# Patient Record
Sex: FEMALE | Race: WHITE | NOT HISPANIC OR LATINO | Employment: OTHER | URBAN - METROPOLITAN AREA
[De-identification: names, ages, dates, MRNs, and addresses within clinical notes are randomized per-mention and may not be internally consistent; named-entity substitution may affect disease eponyms.]

---

## 2022-08-11 ENCOUNTER — LAB VISIT (OUTPATIENT)
Dept: LAB | Facility: HOSPITAL | Age: 62
End: 2022-08-11
Attending: FAMILY MEDICINE
Payer: COMMERCIAL

## 2022-08-11 ENCOUNTER — OFFICE VISIT (OUTPATIENT)
Dept: FAMILY MEDICINE | Facility: CLINIC | Age: 62
End: 2022-08-11
Payer: COMMERCIAL

## 2022-08-11 VITALS
WEIGHT: 169 LBS | DIASTOLIC BLOOD PRESSURE: 76 MMHG | TEMPERATURE: 98 F | BODY MASS INDEX: 31.1 KG/M2 | HEART RATE: 77 BPM | HEIGHT: 62 IN | RESPIRATION RATE: 18 BRPM | SYSTOLIC BLOOD PRESSURE: 132 MMHG | OXYGEN SATURATION: 99 %

## 2022-08-11 DIAGNOSIS — I10 HYPERTENSION, UNSPECIFIED TYPE: ICD-10-CM

## 2022-08-11 DIAGNOSIS — G47.33 OSA ON CPAP: ICD-10-CM

## 2022-08-11 DIAGNOSIS — I49.8 PACEMAKER-DEPENDENT DUE TO NATIVE CARDIAC RHYTHM INSUFFICIENT TO SUPPORT LIFE: ICD-10-CM

## 2022-08-11 DIAGNOSIS — Z95.0 PACEMAKER-DEPENDENT DUE TO NATIVE CARDIAC RHYTHM INSUFFICIENT TO SUPPORT LIFE: ICD-10-CM

## 2022-08-11 DIAGNOSIS — Z01.419 ENCOUNTER FOR ANNUAL ROUTINE GYNECOLOGICAL EXAMINATION: ICD-10-CM

## 2022-08-11 DIAGNOSIS — E78.5 HYPERLIPIDEMIA, UNSPECIFIED HYPERLIPIDEMIA TYPE: ICD-10-CM

## 2022-08-11 DIAGNOSIS — G47.00 INSOMNIA, UNSPECIFIED TYPE: ICD-10-CM

## 2022-08-11 DIAGNOSIS — Z95.0 PACEMAKER-DEPENDENT DUE TO NATIVE CARDIAC RHYTHM INSUFFICIENT TO SUPPORT LIFE: Primary | ICD-10-CM

## 2022-08-11 DIAGNOSIS — I49.8 PACEMAKER-DEPENDENT DUE TO NATIVE CARDIAC RHYTHM INSUFFICIENT TO SUPPORT LIFE: Primary | ICD-10-CM

## 2022-08-11 PROBLEM — C50.919 BREAST CANCER: Status: ACTIVE | Noted: 2022-08-11

## 2022-08-11 PROBLEM — F41.9 ANXIETY: Status: ACTIVE | Noted: 2022-08-11

## 2022-08-11 PROBLEM — K76.0 NON-ALCOHOLIC FATTY LIVER DISEASE: Status: ACTIVE | Noted: 2022-08-11

## 2022-08-11 PROBLEM — E66.9 OBESITY (BMI 30-39.9): Status: ACTIVE | Noted: 2019-03-25

## 2022-08-11 PROBLEM — G89.29 CHRONIC BILATERAL LOW BACK PAIN WITHOUT SCIATICA: Status: ACTIVE | Noted: 2020-11-09

## 2022-08-11 PROBLEM — M54.50 CHRONIC BILATERAL LOW BACK PAIN WITHOUT SCIATICA: Status: ACTIVE | Noted: 2020-11-09

## 2022-08-11 LAB
ALBUMIN SERPL-MCNC: 4.3 GM/DL (ref 3.4–4.8)
ALBUMIN/GLOB SERPL: 1.3 RATIO (ref 1.1–2)
ALP SERPL-CCNC: 111 UNIT/L (ref 40–150)
ALT SERPL-CCNC: 41 UNIT/L (ref 0–55)
AST SERPL-CCNC: 28 UNIT/L (ref 5–34)
BASOPHILS # BLD AUTO: 0.02 X10(3)/MCL (ref 0–0.2)
BASOPHILS NFR BLD AUTO: 0.4 %
BILIRUBIN DIRECT+TOT PNL SERPL-MCNC: 0.4 MG/DL
BUN SERPL-MCNC: 18 MG/DL (ref 9.8–20.1)
CALCIUM SERPL-MCNC: 10 MG/DL (ref 8.4–10.2)
CHLORIDE SERPL-SCNC: 104 MMOL/L (ref 98–107)
CHOLEST SERPL-MCNC: 175 MG/DL
CHOLEST/HDLC SERPL: 3 {RATIO} (ref 0–5)
CO2 SERPL-SCNC: 29 MMOL/L (ref 23–31)
CREAT SERPL-MCNC: 0.81 MG/DL (ref 0.55–1.02)
EOSINOPHIL # BLD AUTO: 0.11 X10(3)/MCL (ref 0–0.9)
EOSINOPHIL NFR BLD AUTO: 2 %
ERYTHROCYTE [DISTWIDTH] IN BLOOD BY AUTOMATED COUNT: 13.4 % (ref 11.5–17)
GFR SERPLBLD CREATININE-BSD FMLA CKD-EPI: >60 MLS/MIN/1.73/M2
GLOBULIN SER-MCNC: 3.3 GM/DL (ref 2.4–3.5)
GLUCOSE SERPL-MCNC: 83 MG/DL (ref 82–115)
HCT VFR BLD AUTO: 40.5 % (ref 37–47)
HDLC SERPL-MCNC: 60 MG/DL (ref 35–60)
HGB BLD-MCNC: 13.3 GM/DL (ref 12–16)
IMM GRANULOCYTES # BLD AUTO: 0.02 X10(3)/MCL (ref 0–0.04)
IMM GRANULOCYTES NFR BLD AUTO: 0.4 %
LDLC SERPL CALC-MCNC: 94 MG/DL (ref 50–140)
LYMPHOCYTES # BLD AUTO: 1.6 X10(3)/MCL (ref 0.6–4.6)
LYMPHOCYTES NFR BLD AUTO: 29.2 %
MCH RBC QN AUTO: 30.2 PG (ref 27–31)
MCHC RBC AUTO-ENTMCNC: 32.8 MG/DL (ref 33–36)
MCV RBC AUTO: 92 FL (ref 80–94)
MONOCYTES # BLD AUTO: 0.28 X10(3)/MCL (ref 0.1–1.3)
MONOCYTES NFR BLD AUTO: 5.1 %
NEUTROPHILS # BLD AUTO: 3.5 X10(3)/MCL (ref 2.1–9.2)
NEUTROPHILS NFR BLD AUTO: 62.9 %
NRBC BLD AUTO-RTO: 0 %
PLATELET # BLD AUTO: 151 X10(3)/MCL (ref 130–400)
PMV BLD AUTO: 9.1 FL (ref 7.4–10.4)
POTASSIUM SERPL-SCNC: 4.3 MMOL/L (ref 3.5–5.1)
PROT SERPL-MCNC: 7.6 GM/DL (ref 5.8–7.6)
RBC # BLD AUTO: 4.4 X10(6)/MCL (ref 4.2–5.4)
SODIUM SERPL-SCNC: 142 MMOL/L (ref 136–145)
TRIGL SERPL-MCNC: 104 MG/DL (ref 37–140)
VLDLC SERPL CALC-MCNC: 21 MG/DL
WBC # SPEC AUTO: 5.5 X10(3)/MCL (ref 4.5–11.5)

## 2022-08-11 PROCEDURE — 3008F PR BODY MASS INDEX (BMI) DOCUMENTED: ICD-10-PCS | Mod: CPTII,,, | Performed by: FAMILY MEDICINE

## 2022-08-11 PROCEDURE — 1160F RVW MEDS BY RX/DR IN RCRD: CPT | Mod: CPTII,,, | Performed by: FAMILY MEDICINE

## 2022-08-11 PROCEDURE — 3075F PR MOST RECENT SYSTOLIC BLOOD PRESS GE 130-139MM HG: ICD-10-PCS | Mod: CPTII,,, | Performed by: FAMILY MEDICINE

## 2022-08-11 PROCEDURE — 99205 OFFICE O/P NEW HI 60 MIN: CPT | Mod: ,,, | Performed by: FAMILY MEDICINE

## 2022-08-11 PROCEDURE — 85025 COMPLETE CBC W/AUTO DIFF WBC: CPT

## 2022-08-11 PROCEDURE — 3075F SYST BP GE 130 - 139MM HG: CPT | Mod: CPTII,,, | Performed by: FAMILY MEDICINE

## 2022-08-11 PROCEDURE — 1159F MED LIST DOCD IN RCRD: CPT | Mod: CPTII,,, | Performed by: FAMILY MEDICINE

## 2022-08-11 PROCEDURE — 3008F BODY MASS INDEX DOCD: CPT | Mod: CPTII,,, | Performed by: FAMILY MEDICINE

## 2022-08-11 PROCEDURE — 4010F PR ACE/ARB THEARPY RXD/TAKEN: ICD-10-PCS | Mod: CPTII,,, | Performed by: FAMILY MEDICINE

## 2022-08-11 PROCEDURE — 1159F PR MEDICATION LIST DOCUMENTED IN MEDICAL RECORD: ICD-10-PCS | Mod: CPTII,,, | Performed by: FAMILY MEDICINE

## 2022-08-11 PROCEDURE — 99205 PR OFFICE/OUTPT VISIT, NEW, LEVL V, 60-74 MIN: ICD-10-PCS | Mod: ,,, | Performed by: FAMILY MEDICINE

## 2022-08-11 PROCEDURE — 3078F PR MOST RECENT DIASTOLIC BLOOD PRESSURE < 80 MM HG: ICD-10-PCS | Mod: CPTII,,, | Performed by: FAMILY MEDICINE

## 2022-08-11 PROCEDURE — 1160F PR REVIEW ALL MEDS BY PRESCRIBER/CLIN PHARMACIST DOCUMENTED: ICD-10-PCS | Mod: CPTII,,, | Performed by: FAMILY MEDICINE

## 2022-08-11 PROCEDURE — 36415 COLL VENOUS BLD VENIPUNCTURE: CPT

## 2022-08-11 PROCEDURE — 80061 LIPID PANEL: CPT

## 2022-08-11 PROCEDURE — 4010F ACE/ARB THERAPY RXD/TAKEN: CPT | Mod: CPTII,,, | Performed by: FAMILY MEDICINE

## 2022-08-11 PROCEDURE — 3078F DIAST BP <80 MM HG: CPT | Mod: CPTII,,, | Performed by: FAMILY MEDICINE

## 2022-08-11 PROCEDURE — 80053 COMPREHEN METABOLIC PANEL: CPT

## 2022-08-11 RX ORDER — SPIRONOLACTONE 25 MG/1
25 TABLET ORAL DAILY
Qty: 30 TABLET | Refills: 3 | Status: SHIPPED | OUTPATIENT
Start: 2022-08-11

## 2022-08-11 RX ORDER — VENLAFAXINE HYDROCHLORIDE 75 MG/1
75 CAPSULE, EXTENDED RELEASE ORAL DAILY
Qty: 30 CAPSULE | Refills: 3 | Status: SHIPPED | OUTPATIENT
Start: 2022-08-11 | End: 2022-09-28

## 2022-08-11 RX ORDER — OMEPRAZOLE 20 MG/1
20 TABLET, DELAYED RELEASE ORAL EVERY MORNING
Qty: 30 TABLET | Refills: 3 | Status: ON HOLD | OUTPATIENT
Start: 2022-08-11 | End: 2024-02-12

## 2022-08-11 RX ORDER — VENLAFAXINE HYDROCHLORIDE 75 MG/1
75 CAPSULE, EXTENDED RELEASE ORAL
COMMUNITY
Start: 2022-01-21 | End: 2022-08-11 | Stop reason: SDUPTHER

## 2022-08-11 RX ORDER — OMEPRAZOLE 20 MG/1
20 TABLET, DELAYED RELEASE ORAL
COMMUNITY
End: 2022-08-11 | Stop reason: SDUPTHER

## 2022-08-11 RX ORDER — VENLAFAXINE HYDROCHLORIDE 150 MG/1
150 CAPSULE, EXTENDED RELEASE ORAL DAILY
COMMUNITY
Start: 2022-07-14 | End: 2022-08-11 | Stop reason: SDUPTHER

## 2022-08-11 RX ORDER — GABAPENTIN 300 MG/1
300 CAPSULE ORAL 2 TIMES DAILY
Qty: 60 CAPSULE | Refills: 3 | Status: SHIPPED | OUTPATIENT
Start: 2022-08-11 | End: 2022-09-19

## 2022-08-11 RX ORDER — TRAZODONE HYDROCHLORIDE 50 MG/1
50 TABLET ORAL NIGHTLY
Qty: 30 TABLET | Refills: 3 | Status: SHIPPED | OUTPATIENT
Start: 2022-08-11 | End: 2023-01-17 | Stop reason: SDUPTHER

## 2022-08-11 RX ORDER — CYCLOBENZAPRINE HCL 5 MG
5 TABLET ORAL DAILY PRN
COMMUNITY
Start: 2022-01-21

## 2022-08-11 RX ORDER — ROSUVASTATIN CALCIUM 10 MG/1
10 TABLET, COATED ORAL NIGHTLY
Qty: 30 TABLET | Refills: 3 | Status: SHIPPED | OUTPATIENT
Start: 2022-08-11 | End: 2023-06-29 | Stop reason: SDUPTHER

## 2022-08-11 RX ORDER — VENLAFAXINE HYDROCHLORIDE 150 MG/1
150 CAPSULE, EXTENDED RELEASE ORAL DAILY
Qty: 30 CAPSULE | Refills: 3 | Status: SHIPPED | OUTPATIENT
Start: 2022-08-11 | End: 2022-09-28

## 2022-08-11 RX ORDER — SPIRONOLACTONE 25 MG/1
25 TABLET ORAL
COMMUNITY
Start: 2022-01-21 | End: 2022-08-11 | Stop reason: SDUPTHER

## 2022-08-11 RX ORDER — LISINOPRIL 20 MG/1
1 TABLET ORAL DAILY
COMMUNITY
Start: 2022-04-26 | End: 2022-08-11 | Stop reason: SDUPTHER

## 2022-08-11 RX ORDER — GABAPENTIN 300 MG/1
1 CAPSULE ORAL 2 TIMES DAILY
COMMUNITY
Start: 2022-01-21 | End: 2022-08-11 | Stop reason: SDUPTHER

## 2022-08-11 RX ORDER — TRAZODONE HYDROCHLORIDE 50 MG/1
1 TABLET ORAL NIGHTLY
COMMUNITY
Start: 2022-03-22 | End: 2022-08-11 | Stop reason: SDUPTHER

## 2022-08-11 RX ORDER — LISINOPRIL 20 MG/1
20 TABLET ORAL DAILY
Qty: 30 TABLET | Refills: 3 | Status: SHIPPED | OUTPATIENT
Start: 2022-08-11

## 2022-08-11 RX ORDER — MELOXICAM 15 MG/1
TABLET ORAL
COMMUNITY
Start: 2022-07-08 | End: 2022-08-11 | Stop reason: SDUPTHER

## 2022-08-11 RX ORDER — ROSUVASTATIN CALCIUM 10 MG/1
10 TABLET, COATED ORAL NIGHTLY
COMMUNITY
Start: 2022-07-09 | End: 2022-08-11 | Stop reason: SDUPTHER

## 2022-08-11 RX ORDER — MELOXICAM 15 MG/1
TABLET ORAL
Qty: 30 TABLET | Refills: 3 | Status: SHIPPED | OUTPATIENT
Start: 2022-08-11 | End: 2022-12-15 | Stop reason: SDUPTHER

## 2022-08-11 NOTE — PROGRESS NOTES
"Subjective:       Patient ID: Donna Graham is a 61 y.o. female.    Chief Complaint: Establish Care      Establish care      Review of Systems   Constitutional: Negative.         Recently moved from [place]   HENT: Negative.    Respiratory: Negative.         DENNIS: using CPAP nightly   Cardiovascular: Negative.         Pacemaker: scheduled with  May 9/21/2022   Gastrointestinal: Negative.         Hx of prior colonoscopy   Genitourinary: Negative.    Musculoskeletal: Negative.    Integumentary:  Negative.   Neurological: Negative.    Hematological: Negative.    Psychiatric/Behavioral: Negative.         Insomnia: tolerating medication, medication working well, patient without any complaints             Objective:      /76 (BP Location: Left arm, Patient Position: Sitting, BP Method: Large (Manual))   Pulse 77   Temp 97.6 °F (36.4 °C)   Resp 18   Ht 5' 2" (1.575 m)   Wt 76.7 kg (169 lb)   SpO2 99%   BMI 30.91 kg/m²    Physical Exam  Constitutional:       General: She is not in acute distress.     Appearance: Normal appearance.   Cardiovascular:      Rate and Rhythm: Normal rate and regular rhythm.   Pulmonary:      Effort: Pulmonary effort is normal.      Breath sounds: Normal breath sounds.   Abdominal:      General: Abdomen is flat. Bowel sounds are normal.      Palpations: Abdomen is soft.   Musculoskeletal:         General: Normal range of motion.   Neurological:      Mental Status: She is alert.   Psychiatric:         Mood and Affect: Mood normal.         Behavior: Behavior normal.         Thought Content: Thought content normal.         Judgment: Judgment normal.             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 Pacemaker  Keep scheduled appointment with Dr. Gerber    2. Insomnia  Controlled  Continue current Rx medication; Rx sent to pharmacy  Return to clinic with any concerns    3. DENNIS  Continue CPAP    4. Hypertension  Continue current medication  Rx sent to pharmacy  Check lab work    5. Hyperlipidemia  Continue current medications; Rx sent to pharmacy  Check FLP    6. Annual gyn exam  Refer patient to Ob gyn

## 2022-09-19 PROBLEM — N95.1 HOT FLASHES DUE TO MENOPAUSE: Status: ACTIVE | Noted: 2022-09-19

## 2022-09-26 LAB
HUMAN PAPILLOMAVIRUS (HPV): NORMAL
PAP RECOMMENDATION EXT: NORMAL
PAP SMEAR: NORMAL

## 2022-09-28 ENCOUNTER — OFFICE VISIT (OUTPATIENT)
Dept: FAMILY MEDICINE | Facility: CLINIC | Age: 62
End: 2022-09-28
Payer: COMMERCIAL

## 2022-09-28 VITALS
OXYGEN SATURATION: 98 % | TEMPERATURE: 98 F | HEART RATE: 73 BPM | SYSTOLIC BLOOD PRESSURE: 138 MMHG | RESPIRATION RATE: 18 BRPM | BODY MASS INDEX: 36.44 KG/M2 | HEIGHT: 62 IN | DIASTOLIC BLOOD PRESSURE: 78 MMHG | WEIGHT: 198 LBS

## 2022-09-28 DIAGNOSIS — Z12.31 SCREENING MAMMOGRAM, ENCOUNTER FOR: ICD-10-CM

## 2022-09-28 DIAGNOSIS — Z23 NEED FOR INFLUENZA VACCINATION: Primary | ICD-10-CM

## 2022-09-28 DIAGNOSIS — Z23 NEED FOR VACCINATION: ICD-10-CM

## 2022-09-28 DIAGNOSIS — N95.1 HOT FLASHES DUE TO MENOPAUSE: Primary | ICD-10-CM

## 2022-09-28 PROCEDURE — 90686 IIV4 VACC NO PRSV 0.5 ML IM: CPT | Mod: ,,, | Performed by: FAMILY MEDICINE

## 2022-09-28 PROCEDURE — 3008F BODY MASS INDEX DOCD: CPT | Mod: CPTII,,, | Performed by: FAMILY MEDICINE

## 2022-09-28 PROCEDURE — 3078F DIAST BP <80 MM HG: CPT | Mod: CPTII,,, | Performed by: FAMILY MEDICINE

## 2022-09-28 PROCEDURE — 90686 FLU VACCINE (QUAD) GREATER THAN OR EQUAL TO 3YO PRESERVATIVE FREE IM: ICD-10-PCS | Mod: ,,, | Performed by: FAMILY MEDICINE

## 2022-09-28 PROCEDURE — 99214 OFFICE O/P EST MOD 30 MIN: CPT | Mod: 25,,, | Performed by: FAMILY MEDICINE

## 2022-09-28 PROCEDURE — 3075F SYST BP GE 130 - 139MM HG: CPT | Mod: CPTII,,, | Performed by: FAMILY MEDICINE

## 2022-09-28 PROCEDURE — 4010F ACE/ARB THERAPY RXD/TAKEN: CPT | Mod: CPTII,,, | Performed by: FAMILY MEDICINE

## 2022-09-28 PROCEDURE — 1160F RVW MEDS BY RX/DR IN RCRD: CPT | Mod: CPTII,,, | Performed by: FAMILY MEDICINE

## 2022-09-28 PROCEDURE — 3008F PR BODY MASS INDEX (BMI) DOCUMENTED: ICD-10-PCS | Mod: CPTII,,, | Performed by: FAMILY MEDICINE

## 2022-09-28 PROCEDURE — 4010F PR ACE/ARB THEARPY RXD/TAKEN: ICD-10-PCS | Mod: CPTII,,, | Performed by: FAMILY MEDICINE

## 2022-09-28 PROCEDURE — 90471 FLU VACCINE (QUAD) GREATER THAN OR EQUAL TO 3YO PRESERVATIVE FREE IM: ICD-10-PCS | Mod: ,,, | Performed by: FAMILY MEDICINE

## 2022-09-28 PROCEDURE — 1159F PR MEDICATION LIST DOCUMENTED IN MEDICAL RECORD: ICD-10-PCS | Mod: CPTII,,, | Performed by: FAMILY MEDICINE

## 2022-09-28 PROCEDURE — 99214 PR OFFICE/OUTPT VISIT, EST, LEVL IV, 30-39 MIN: ICD-10-PCS | Mod: 25,,, | Performed by: FAMILY MEDICINE

## 2022-09-28 PROCEDURE — 90471 IMMUNIZATION ADMIN: CPT | Mod: ,,, | Performed by: FAMILY MEDICINE

## 2022-09-28 PROCEDURE — 1159F MED LIST DOCD IN RCRD: CPT | Mod: CPTII,,, | Performed by: FAMILY MEDICINE

## 2022-09-28 PROCEDURE — 1160F PR REVIEW ALL MEDS BY PRESCRIBER/CLIN PHARMACIST DOCUMENTED: ICD-10-PCS | Mod: CPTII,,, | Performed by: FAMILY MEDICINE

## 2022-09-28 PROCEDURE — 3078F PR MOST RECENT DIASTOLIC BLOOD PRESSURE < 80 MM HG: ICD-10-PCS | Mod: CPTII,,, | Performed by: FAMILY MEDICINE

## 2022-09-28 PROCEDURE — 3075F PR MOST RECENT SYSTOLIC BLOOD PRESS GE 130-139MM HG: ICD-10-PCS | Mod: CPTII,,, | Performed by: FAMILY MEDICINE

## 2022-09-28 RX ORDER — METOPROLOL SUCCINATE 25 MG/1
25 TABLET, EXTENDED RELEASE ORAL DAILY
COMMUNITY
Start: 2022-09-21

## 2022-09-28 RX ORDER — OMEPRAZOLE 20 MG/1
CAPSULE, DELAYED RELEASE ORAL
Status: ON HOLD | COMMUNITY
Start: 2022-09-08 | End: 2022-10-20 | Stop reason: CLARIF

## 2022-09-28 RX ORDER — PAROXETINE 30 MG/1
30 TABLET, FILM COATED ORAL EVERY MORNING
Qty: 30 TABLET | Refills: 1 | Status: SHIPPED | OUTPATIENT
Start: 2022-09-28 | End: 2023-11-09 | Stop reason: SDUPTHER

## 2022-09-28 RX ORDER — NITROGLYCERIN 0.4 MG/1
TABLET SUBLINGUAL
COMMUNITY
Start: 2022-09-21

## 2022-09-28 NOTE — PROGRESS NOTES
"Subjective:       Patient ID: Donna Graham is a 61 y.o. female.    Chief Complaint: medication mangement and hot flashes, saw GYN wants to update medications       Routine      Review of Systems   Constitutional: Negative.    Respiratory: Negative.     Cardiovascular: Negative.    Endocrine:        Hot flashes: seen by GYN (Dr Washington), discussed changing medication         Objective:      /78 (BP Location: Left arm, Patient Position: Sitting, BP Method: Large (Manual))   Pulse 73   Temp 97.5 °F (36.4 °C)   Resp 18   Ht 5' 2" (1.575 m)   Wt 89.8 kg (198 lb)   SpO2 98%   BMI 36.21 kg/m²    Physical Exam  Constitutional:       Appearance: Normal appearance.   Cardiovascular:      Rate and Rhythm: Normal rate and regular rhythm.      Heart sounds: Normal heart sounds.   Pulmonary:      Effort: Pulmonary effort is normal.      Breath sounds: Normal breath sounds.   Neurological:      Mental Status: She is alert.   Psychiatric:         Mood and Affect: Mood normal.         Behavior: Behavior normal.         Thought Content: Thought content normal.         Judgment: Judgment normal.           Assessment:       Problem List Items Addressed This Visit          Renal/    Hot flashes due to menopause - Primary    Overview     Increase gabapendin to 300 TID  Will ask PCP Elvin San MD about clonidine and adding or substituting paroxetine              Relevant Medications    paroxetine (PAXIL) 30 MG tablet     Other Visit Diagnoses       Screening mammogram, encounter for        Need for vaccination                   Plan:   1. Hot flashes due to menopause  Stop Effexor  Start Paxil 30 mg q.day   Monitor   Return to clinic if any concerns  Orders:  -     paroxetine (PAXIL) 30 MG tablet    2. Screening mammogram, encounter for  Order given for mammogram    3. Need for vaccination  Flu shot today      "

## 2022-10-14 ENCOUNTER — LAB VISIT (OUTPATIENT)
Dept: LAB | Facility: HOSPITAL | Age: 62
End: 2022-10-14
Attending: INTERNAL MEDICINE
Payer: COMMERCIAL

## 2022-10-14 DIAGNOSIS — F41.9 ANXIETY: ICD-10-CM

## 2022-10-14 DIAGNOSIS — I10 HYPERTENSION, UNSPECIFIED TYPE: Primary | ICD-10-CM

## 2022-10-14 DIAGNOSIS — I49.8 PACEMAKER-DEPENDENT DUE TO NATIVE CARDIAC RHYTHM INSUFFICIENT TO SUPPORT LIFE: ICD-10-CM

## 2022-10-14 DIAGNOSIS — Z95.0 PACEMAKER-DEPENDENT DUE TO NATIVE CARDIAC RHYTHM INSUFFICIENT TO SUPPORT LIFE: ICD-10-CM

## 2022-10-14 DIAGNOSIS — E78.5 HYPERLIPIDEMIA, UNSPECIFIED HYPERLIPIDEMIA TYPE: ICD-10-CM

## 2022-10-14 LAB
ANION GAP SERPL CALC-SCNC: 8 MEQ/L
BUN SERPL-MCNC: 16.9 MG/DL (ref 9.8–20.1)
CALCIUM SERPL-MCNC: 10.1 MG/DL (ref 8.4–10.2)
CHLORIDE SERPL-SCNC: 105 MMOL/L (ref 98–107)
CO2 SERPL-SCNC: 29 MMOL/L (ref 23–31)
CREAT SERPL-MCNC: 0.88 MG/DL (ref 0.55–1.02)
CREAT/UREA NIT SERPL: 19
ERYTHROCYTE [DISTWIDTH] IN BLOOD BY AUTOMATED COUNT: 13 % (ref 11.5–17)
GFR SERPLBLD CREATININE-BSD FMLA CKD-EPI: >60 MLS/MIN/1.73/M2
GLUCOSE SERPL-MCNC: 88 MG/DL (ref 82–115)
HCT VFR BLD AUTO: 40.8 % (ref 37–47)
HGB BLD-MCNC: 12.9 GM/DL (ref 12–16)
MCH RBC QN AUTO: 29.9 PG (ref 27–31)
MCHC RBC AUTO-ENTMCNC: 31.6 MG/DL (ref 33–36)
MCV RBC AUTO: 94.7 FL (ref 80–94)
NRBC BLD AUTO-RTO: 0 %
PLATELET # BLD AUTO: 147 X10(3)/MCL (ref 130–400)
PMV BLD AUTO: 9.4 FL (ref 7.4–10.4)
POTASSIUM SERPL-SCNC: 4.3 MMOL/L (ref 3.5–5.1)
RBC # BLD AUTO: 4.31 X10(6)/MCL (ref 4.2–5.4)
SODIUM SERPL-SCNC: 142 MMOL/L (ref 136–145)
WBC # SPEC AUTO: 5.2 X10(3)/MCL (ref 4.5–11.5)

## 2022-10-14 PROCEDURE — 80048 BASIC METABOLIC PNL TOTAL CA: CPT

## 2022-10-14 PROCEDURE — 85027 COMPLETE CBC AUTOMATED: CPT

## 2022-10-14 PROCEDURE — 36415 COLL VENOUS BLD VENIPUNCTURE: CPT

## 2022-10-17 ENCOUNTER — PATIENT MESSAGE (OUTPATIENT)
Dept: ADMINISTRATIVE | Facility: OTHER | Age: 62
End: 2022-10-17
Payer: COMMERCIAL

## 2022-10-20 ENCOUNTER — HOSPITAL ENCOUNTER (OUTPATIENT)
Facility: HOSPITAL | Age: 62
Discharge: HOME | End: 2022-10-20
Attending: INTERNAL MEDICINE | Admitting: INTERNAL MEDICINE
Payer: COMMERCIAL

## 2022-10-20 VITALS
DIASTOLIC BLOOD PRESSURE: 81 MMHG | WEIGHT: 192.44 LBS | SYSTOLIC BLOOD PRESSURE: 136 MMHG | HEART RATE: 60 BPM | RESPIRATION RATE: 15 BRPM | HEIGHT: 62 IN | TEMPERATURE: 98 F | OXYGEN SATURATION: 96 % | BODY MASS INDEX: 35.41 KG/M2

## 2022-10-20 DIAGNOSIS — Z01.818 PREOP TESTING: ICD-10-CM

## 2022-10-20 DIAGNOSIS — R07.9 CHEST PAIN, UNSPECIFIED: Primary | ICD-10-CM

## 2022-10-20 DIAGNOSIS — I25.10 CAD (CORONARY ARTERY DISEASE): ICD-10-CM

## 2022-10-20 PROCEDURE — 25000003 PHARM REV CODE 250: Performed by: INTERNAL MEDICINE

## 2022-10-20 PROCEDURE — 99152 MOD SED SAME PHYS/QHP 5/>YRS: CPT | Performed by: INTERNAL MEDICINE

## 2022-10-20 PROCEDURE — C1874 STENT, COATED/COV W/DEL SYS: HCPCS | Performed by: INTERNAL MEDICINE

## 2022-10-20 PROCEDURE — 27201423 OPTIME MED/SURG SUP & DEVICES STERILE SUPPLY: Performed by: INTERNAL MEDICINE

## 2022-10-20 PROCEDURE — C1753 CATH, INTRAVAS ULTRASOUND: HCPCS | Performed by: INTERNAL MEDICINE

## 2022-10-20 PROCEDURE — 93005 ELECTROCARDIOGRAM TRACING: CPT

## 2022-10-20 PROCEDURE — 0715T *HC CORONARY LITHOTRIPSY: CPT | Performed by: INTERNAL MEDICINE

## 2022-10-20 PROCEDURE — C1761 OPTIME CATH, TRANSLUMINAL INTRAVASC LITHO, CORONARY: HCPCS | Performed by: INTERNAL MEDICINE

## 2022-10-20 PROCEDURE — 93010 EKG 12-LEAD: ICD-10-PCS | Mod: ,,, | Performed by: INTERNAL MEDICINE

## 2022-10-20 PROCEDURE — 25500020 PHARM REV CODE 255: Performed by: INTERNAL MEDICINE

## 2022-10-20 PROCEDURE — 99153 MOD SED SAME PHYS/QHP EA: CPT | Performed by: INTERNAL MEDICINE

## 2022-10-20 PROCEDURE — C1769 GUIDE WIRE: HCPCS | Performed by: INTERNAL MEDICINE

## 2022-10-20 PROCEDURE — 63600175 PHARM REV CODE 636 W HCPCS: Performed by: INTERNAL MEDICINE

## 2022-10-20 PROCEDURE — 92978 ENDOLUMINL IVUS OCT C 1ST: CPT | Mod: RC | Performed by: INTERNAL MEDICINE

## 2022-10-20 PROCEDURE — 93458 L HRT ARTERY/VENTRICLE ANGIO: CPT | Mod: XU | Performed by: INTERNAL MEDICINE

## 2022-10-20 PROCEDURE — C9600 PERC DRUG-EL COR STENT SING: HCPCS | Mod: RC | Performed by: INTERNAL MEDICINE

## 2022-10-20 PROCEDURE — C1725 CATH, TRANSLUMIN NON-LASER: HCPCS | Performed by: INTERNAL MEDICINE

## 2022-10-20 PROCEDURE — C1760 CLOSURE DEV, VASC: HCPCS | Performed by: INTERNAL MEDICINE

## 2022-10-20 PROCEDURE — 93010 ELECTROCARDIOGRAM REPORT: CPT | Mod: ,,, | Performed by: INTERNAL MEDICINE

## 2022-10-20 PROCEDURE — C1887 CATHETER, GUIDING: HCPCS | Performed by: INTERNAL MEDICINE

## 2022-10-20 PROCEDURE — C1894 INTRO/SHEATH, NON-LASER: HCPCS | Performed by: INTERNAL MEDICINE

## 2022-10-20 DEVICE — ANGIO-SEAL VIP VASCULAR CLOSURE DEVICE
Type: IMPLANTABLE DEVICE | Site: GROIN | Status: FUNCTIONAL
Brand: ANGIO-SEAL

## 2022-10-20 DEVICE — STENT RONYX40015UX RESOLUTE ONYX 4.00X15
Type: IMPLANTABLE DEVICE | Site: CORONARY | Status: FUNCTIONAL
Brand: RESOLUTE ONYX™

## 2022-10-20 RX ORDER — CLOPIDOGREL BISULFATE 75 MG/1
75 TABLET ORAL DAILY
Qty: 90 TABLET | Refills: 3 | Status: SHIPPED | OUTPATIENT
Start: 2022-10-20 | End: 2024-02-19

## 2022-10-20 RX ORDER — LIDOCAINE HYDROCHLORIDE 20 MG/ML
INJECTION, SOLUTION INFILTRATION; PERINEURAL
Status: DISCONTINUED | OUTPATIENT
Start: 2022-10-20 | End: 2022-10-20 | Stop reason: HOSPADM

## 2022-10-20 RX ORDER — DIAZEPAM 5 MG/1
10 TABLET ORAL ONCE
Status: COMPLETED | OUTPATIENT
Start: 2022-10-20 | End: 2022-10-20

## 2022-10-20 RX ORDER — DIPHENHYDRAMINE HCL 25 MG
50 CAPSULE ORAL
Status: DISCONTINUED | OUTPATIENT
Start: 2022-10-20 | End: 2022-10-20 | Stop reason: HOSPADM

## 2022-10-20 RX ORDER — SODIUM CHLORIDE 0.9 % (FLUSH) 0.9 %
10 SYRINGE (ML) INJECTION
Status: DISCONTINUED | OUTPATIENT
Start: 2022-10-20 | End: 2022-10-20 | Stop reason: HOSPADM

## 2022-10-20 RX ORDER — SODIUM CHLORIDE 9 MG/ML
INJECTION, SOLUTION INTRAVENOUS ONCE
Status: COMPLETED | OUTPATIENT
Start: 2022-10-20 | End: 2022-10-20

## 2022-10-20 RX ORDER — SODIUM CHLORIDE 9 MG/ML
INJECTION, SOLUTION INTRAVENOUS CONTINUOUS
Status: DISCONTINUED | OUTPATIENT
Start: 2022-10-20 | End: 2022-10-20 | Stop reason: HOSPADM

## 2022-10-20 RX ORDER — ONDANSETRON 4 MG/1
8 TABLET, ORALLY DISINTEGRATING ORAL EVERY 8 HOURS PRN
Status: DISCONTINUED | OUTPATIENT
Start: 2022-10-20 | End: 2022-10-20 | Stop reason: HOSPADM

## 2022-10-20 RX ORDER — HEPARIN SODIUM 1000 [USP'U]/ML
INJECTION, SOLUTION INTRAVENOUS; SUBCUTANEOUS
Status: DISCONTINUED | OUTPATIENT
Start: 2022-10-20 | End: 2022-10-20 | Stop reason: HOSPADM

## 2022-10-20 RX ORDER — ACETAMINOPHEN 325 MG/1
650 TABLET ORAL EVERY 4 HOURS PRN
Status: DISCONTINUED | OUTPATIENT
Start: 2022-10-20 | End: 2022-10-20 | Stop reason: HOSPADM

## 2022-10-20 RX ORDER — FENTANYL CITRATE 50 UG/ML
INJECTION, SOLUTION INTRAMUSCULAR; INTRAVENOUS
Status: DISCONTINUED | OUTPATIENT
Start: 2022-10-20 | End: 2022-10-20 | Stop reason: HOSPADM

## 2022-10-20 RX ORDER — MAG HYDROX/ALUMINUM HYD/SIMETH 200-200-20
SUSPENSION, ORAL (FINAL DOSE FORM) ORAL
Status: DISCONTINUED | OUTPATIENT
Start: 2022-10-20 | End: 2022-10-20 | Stop reason: HOSPADM

## 2022-10-20 RX ORDER — CLOPIDOGREL 300 MG/1
TABLET, FILM COATED ORAL
Status: DISCONTINUED | OUTPATIENT
Start: 2022-10-20 | End: 2022-10-20 | Stop reason: HOSPADM

## 2022-10-20 RX ORDER — CEFAZOLIN SODIUM 1 G/3ML
INJECTION, POWDER, FOR SOLUTION INTRAMUSCULAR; INTRAVENOUS
Status: DISCONTINUED | OUTPATIENT
Start: 2022-10-20 | End: 2022-10-20 | Stop reason: HOSPADM

## 2022-10-20 RX ORDER — ASPIRIN 81 MG/1
81 TABLET ORAL NIGHTLY
COMMUNITY

## 2022-10-20 RX ORDER — MIDAZOLAM HYDROCHLORIDE 1 MG/ML
INJECTION INTRAMUSCULAR; INTRAVENOUS
Status: DISCONTINUED | OUTPATIENT
Start: 2022-10-20 | End: 2022-10-20 | Stop reason: HOSPADM

## 2022-10-20 RX ADMIN — DIAZEPAM 10 MG: 5 TABLET ORAL at 07:10

## 2022-10-20 RX ADMIN — SODIUM CHLORIDE: 9 INJECTION, SOLUTION INTRAVENOUS at 07:10

## 2022-10-20 RX ADMIN — DIPHENHYDRAMINE HYDROCHLORIDE 50 MG: 25 CAPSULE ORAL at 07:10

## 2022-10-20 NOTE — Clinical Note
The catheter was inserted into the, was removed from the and was inserted over the wire into the proximal   right coronary artery. IVUS performed of Prox-Ost RCA

## 2022-10-20 NOTE — Clinical Note
The catheter was inserted into the, was removed from the and was inserted over the wire into the ostium   left main. An angiography was performed of the left coronary arteries. Multiple views were taken. The angiography was performed via power injection.  JL4

## 2022-10-20 NOTE — BRIEF OP NOTE
Brief Operative Note:    : Aron Pereyra MD     Referring Physician: Yanick Gerber     All Operators: Surgeon(s):  Aron Pereyra MD     Preoperative Diagnosis: Chest pain, unspecified [R07.9]     Postop Diagnosis: Chest pain, unspecified [R07.9]    Treatments/Procedures: Procedure(s) (LRB):  CATHETERIZATION, HEART, LEFT (Left)    Findings: Severe ostial RCA stenosis s/p shockwave and angiosculpt 3.5 mm balloons. 4x15 mm Reji stent deployed. Post-dilated with 4 mm NC balloon. Angioseal to right groin. See catheterization report for full details.    Estimated Blood loss: 15 cc    Specimens removed: No    Aron Pereyra MD

## 2022-10-20 NOTE — DISCHARGE INSTRUCTIONS
ANGIOGRAM / GROIN SITE CARE AFTER INSTRUCTION SHEET GIVEN AND REVIEWED  PLAVIX PRESCRIPTION SENT TO [facility]  LOCATION

## 2022-10-20 NOTE — Clinical Note
The right groin was prepped. The site was prepped with ChloraPrep. The site was clipped. The patient was draped. The patient was positioned supine. The patient was secured with ulnar pads.

## 2022-10-20 NOTE — Clinical Note
An angiography was performed of the right coronary arteries. The angiography was performed via power injection.  Post intervention angio performed

## 2022-10-20 NOTE — Clinical Note
The catheter was inserted into the, was removed from the and was inserted over the wire into the left ventricle. Hemodynamics were performed.  and Pullback was recorded.  PIG

## 2022-10-20 NOTE — Clinical Note
The sheath was exchanged in the right femoral artery. Render Risk Assessment In Note?: no Other (Free Text): Courtesy removal Detail Level: Simple Note Text (......Xxx Chief Complaint.): This diagnosis correlates with the

## 2022-10-20 NOTE — INTERVAL H&P NOTE
Patient name: Donna Graham  MRN: [id number]  : 1960  Cath Lab Procedure H&P Update    Pre-Procedure Assessment:    I saw and examined the patient face to face. The patient has been re-evaluated and her condition is unchanged. The reason for admission, procedure and care is still present.  Based on the patients H&P, pre-procedure physical exam, relevant diagnostic studies, NPO status and information obtained from the patient, I determine the patient is an appropriate candidate for the proposed procedure and anesthesia planned. I further certify the anesthesia risks, benefits and options have been explained to the patient to which she agrees as documented on the procedural consent.

## 2022-10-20 NOTE — Clinical Note
ostium   right coronary artery. An angiography was performed of the right coronary arteries. The angiography was performed via power injection.

## 2022-10-20 NOTE — DISCHARGE SUMMARY
[facility] - Cath Lab Services  Discharge Note  Short Stay    Procedure(s) (LRB):  CATHETERIZATION, HEART, LEFT (Left)      OUTCOME: Patient tolerated treatment/procedure well without complication and is now ready for discharge.    DISPOSITION: Home or Self Care    FINAL DIAGNOSIS:  Severe ostial RCA disease s/p PCI    FOLLOWUP: In clinic    DISCHARGE INSTRUCTIONS:  See discharge orders    TIME SPENT ON DISCHARGE: 31 minutes

## 2022-10-20 NOTE — Clinical Note
The catheter was inserted into the, was removed from the and was inserted over the wire into the ostium   right coronary artery. IVUS performed.

## 2022-10-20 NOTE — Clinical Note
The catheter was inserted into the, was removed from the and was inserted over the wire into the ostium   right coronary artery. An angiography was performed of the right coronary arteries. Multiple views were taken. The angiography was performed via power injection.  MARII

## 2022-11-02 ENCOUNTER — DOCUMENTATION ONLY (OUTPATIENT)
Dept: ADMINISTRATIVE | Facility: HOSPITAL | Age: 62
End: 2022-11-02
Payer: COMMERCIAL

## 2022-11-02 LAB — BCS RECOMMENDATION EXT: NORMAL

## 2022-11-08 ENCOUNTER — OFFICE VISIT (OUTPATIENT)
Dept: FAMILY MEDICINE | Facility: CLINIC | Age: 62
End: 2022-11-08
Payer: COMMERCIAL

## 2022-11-08 DIAGNOSIS — Z91.199 NO-SHOW FOR APPOINTMENT: Primary | ICD-10-CM

## 2022-11-08 PROCEDURE — 1160F PR REVIEW ALL MEDS BY PRESCRIBER/CLIN PHARMACIST DOCUMENTED: ICD-10-PCS | Mod: CPTII,,, | Performed by: FAMILY MEDICINE

## 2022-11-08 PROCEDURE — 1159F PR MEDICATION LIST DOCUMENTED IN MEDICAL RECORD: ICD-10-PCS | Mod: CPTII,,, | Performed by: FAMILY MEDICINE

## 2022-11-08 PROCEDURE — 99499 NO LOS: ICD-10-PCS | Mod: ,,, | Performed by: FAMILY MEDICINE

## 2022-11-08 PROCEDURE — 1160F RVW MEDS BY RX/DR IN RCRD: CPT | Mod: CPTII,,, | Performed by: FAMILY MEDICINE

## 2022-11-08 PROCEDURE — 1159F MED LIST DOCD IN RCRD: CPT | Mod: CPTII,,, | Performed by: FAMILY MEDICINE

## 2022-11-08 PROCEDURE — 99499 UNLISTED E&M SERVICE: CPT | Mod: ,,, | Performed by: FAMILY MEDICINE

## 2022-11-08 PROCEDURE — 4010F ACE/ARB THERAPY RXD/TAKEN: CPT | Mod: CPTII,,, | Performed by: FAMILY MEDICINE

## 2022-11-08 PROCEDURE — 4010F PR ACE/ARB THEARPY RXD/TAKEN: ICD-10-PCS | Mod: CPTII,,, | Performed by: FAMILY MEDICINE

## 2022-11-09 ENCOUNTER — HOSPITAL ENCOUNTER (OUTPATIENT)
Dept: RADIOLOGY | Facility: HOSPITAL | Age: 62
Discharge: HOME | End: 2022-11-09
Attending: INTERNAL MEDICINE
Payer: COMMERCIAL

## 2022-11-09 DIAGNOSIS — F41.9 ANXIETY: Primary | ICD-10-CM

## 2022-11-09 DIAGNOSIS — F41.9 ANXIETY: ICD-10-CM

## 2022-11-09 PROCEDURE — 71046 X-RAY EXAM CHEST 2 VIEWS: CPT | Mod: TC

## 2022-12-07 ENCOUNTER — DOCUMENTATION ONLY (OUTPATIENT)
Dept: FAMILY MEDICINE | Facility: CLINIC | Age: 62
End: 2022-12-07
Payer: COMMERCIAL

## 2022-12-07 LAB — BCS RECOMMENDATION EXT: NORMAL

## 2022-12-15 DIAGNOSIS — R52 PAIN: Primary | ICD-10-CM

## 2022-12-15 RX ORDER — MELOXICAM 15 MG/1
TABLET ORAL
Qty: 30 TABLET | Refills: 3 | Status: SHIPPED | OUTPATIENT
Start: 2022-12-15 | End: 2022-12-27 | Stop reason: SDUPTHER

## 2022-12-19 ENCOUNTER — LAB VISIT (OUTPATIENT)
Dept: LAB | Facility: HOSPITAL | Age: 62
End: 2022-12-19
Attending: INTERNAL MEDICINE
Payer: MEDICAID

## 2022-12-19 DIAGNOSIS — I10 HYPERTENSION, UNSPECIFIED TYPE: Primary | ICD-10-CM

## 2022-12-19 LAB
ANION GAP SERPL CALC-SCNC: 9 MEQ/L
BUN SERPL-MCNC: 26.4 MG/DL (ref 9.8–20.1)
CALCIUM SERPL-MCNC: 10.2 MG/DL (ref 8.4–10.2)
CHLORIDE SERPL-SCNC: 104 MMOL/L (ref 98–107)
CO2 SERPL-SCNC: 29 MMOL/L (ref 23–31)
CREAT SERPL-MCNC: 1.28 MG/DL (ref 0.55–1.02)
CREAT/UREA NIT SERPL: 21
GFR SERPLBLD CREATININE-BSD FMLA CKD-EPI: 47 MLS/MIN/1.73/M2
GLUCOSE SERPL-MCNC: 105 MG/DL (ref 82–115)
POTASSIUM SERPL-SCNC: 5.2 MMOL/L (ref 3.5–5.1)
SODIUM SERPL-SCNC: 142 MMOL/L (ref 136–145)

## 2022-12-19 PROCEDURE — 36415 COLL VENOUS BLD VENIPUNCTURE: CPT

## 2022-12-19 PROCEDURE — 80048 BASIC METABOLIC PNL TOTAL CA: CPT

## 2022-12-27 DIAGNOSIS — R52 PAIN: ICD-10-CM

## 2022-12-27 RX ORDER — MELOXICAM 15 MG/1
TABLET ORAL
Qty: 30 TABLET | Refills: 3 | Status: SHIPPED | OUTPATIENT
Start: 2022-12-27 | End: 2023-04-27 | Stop reason: SDUPTHER

## 2023-01-17 DIAGNOSIS — G47.00 INSOMNIA, UNSPECIFIED TYPE: ICD-10-CM

## 2023-01-17 RX ORDER — TRAZODONE HYDROCHLORIDE 50 MG/1
50 TABLET ORAL NIGHTLY
Qty: 30 TABLET | Refills: 3 | Status: SHIPPED | OUTPATIENT
Start: 2023-01-17 | End: 2023-07-03 | Stop reason: SDUPTHER

## 2023-01-23 ENCOUNTER — PATIENT MESSAGE (OUTPATIENT)
Dept: ADMINISTRATIVE | Facility: HOSPITAL | Age: 63
End: 2023-01-23
Payer: MEDICAID

## 2023-03-14 ENCOUNTER — PATIENT OUTREACH (OUTPATIENT)
Dept: ADMINISTRATIVE | Facility: HOSPITAL | Age: 63
End: 2023-03-14
Payer: MEDICAID

## 2023-03-14 NOTE — PROGRESS NOTES
Population Health. Out Reach. Reviewing patient's chart for quality metrics. I attempted to contact patient to see if she has had a colonoscopy. No answer. Left message.

## 2023-04-26 DIAGNOSIS — R52 PAIN: ICD-10-CM

## 2023-04-27 DIAGNOSIS — R52 PAIN: ICD-10-CM

## 2023-04-27 RX ORDER — MELOXICAM 15 MG/1
TABLET ORAL
Qty: 30 TABLET | Refills: 3 | Status: SHIPPED | OUTPATIENT
Start: 2023-04-27

## 2023-05-01 ENCOUNTER — PATIENT MESSAGE (OUTPATIENT)
Dept: ADMINISTRATIVE | Facility: HOSPITAL | Age: 63
End: 2023-05-01
Payer: MEDICAID

## 2023-05-27 LAB — NONINV COLON CA DNA+OCC BLD SCRN STL QL: NEGATIVE

## 2023-06-20 ENCOUNTER — DOCUMENTATION ONLY (OUTPATIENT)
Dept: ADMINISTRATIVE | Facility: HOSPITAL | Age: 63
End: 2023-06-20
Payer: MEDICAID

## 2023-06-29 DIAGNOSIS — E78.5 HYPERLIPIDEMIA, UNSPECIFIED HYPERLIPIDEMIA TYPE: ICD-10-CM

## 2023-06-29 RX ORDER — ROSUVASTATIN CALCIUM 10 MG/1
10 TABLET, COATED ORAL NIGHTLY
Qty: 30 TABLET | Refills: 3 | Status: SHIPPED | OUTPATIENT
Start: 2023-06-29

## 2023-07-03 DIAGNOSIS — G47.00 INSOMNIA, UNSPECIFIED TYPE: ICD-10-CM

## 2023-07-03 RX ORDER — TRAZODONE HYDROCHLORIDE 50 MG/1
50 TABLET ORAL NIGHTLY
Qty: 30 TABLET | Refills: 3 | Status: SHIPPED | OUTPATIENT
Start: 2023-07-03

## 2023-07-26 ENCOUNTER — HOSPITAL ENCOUNTER (OUTPATIENT)
Facility: HOSPITAL | Age: 63
Discharge: HOME | End: 2023-07-26
Attending: INTERNAL MEDICINE | Admitting: INTERNAL MEDICINE
Payer: MEDICAID

## 2023-07-26 VITALS
HEART RATE: 62 BPM | WEIGHT: 194.88 LBS | DIASTOLIC BLOOD PRESSURE: 79 MMHG | BODY MASS INDEX: 35.86 KG/M2 | RESPIRATION RATE: 12 BRPM | TEMPERATURE: 98 F | HEIGHT: 62 IN | OXYGEN SATURATION: 94 % | SYSTOLIC BLOOD PRESSURE: 119 MMHG

## 2023-07-26 DIAGNOSIS — I25.10 CAD (CORONARY ARTERY DISEASE): ICD-10-CM

## 2023-07-26 DIAGNOSIS — Z45.010 ENCOUNTER FOR PACEMAKER AT END OF BATTERY LIFE: ICD-10-CM

## 2023-07-26 DIAGNOSIS — I49.9 ARRHYTHMIA: ICD-10-CM

## 2023-07-26 PROCEDURE — 33228 REMV&REPLC PM GEN DUAL LEAD: CPT | Performed by: INTERNAL MEDICINE

## 2023-07-26 PROCEDURE — C1785 PMKR, DUAL, RATE-RESP: HCPCS | Performed by: INTERNAL MEDICINE

## 2023-07-26 PROCEDURE — 99152 MOD SED SAME PHYS/QHP 5/>YRS: CPT | Performed by: INTERNAL MEDICINE

## 2023-07-26 PROCEDURE — 93010 ELECTROCARDIOGRAM REPORT: CPT | Mod: 76,,, | Performed by: INTERNAL MEDICINE

## 2023-07-26 PROCEDURE — 63600175 PHARM REV CODE 636 W HCPCS: Performed by: INTERNAL MEDICINE

## 2023-07-26 PROCEDURE — 25000003 PHARM REV CODE 250: Performed by: INTERNAL MEDICINE

## 2023-07-26 PROCEDURE — 93010 EKG 12-LEAD: ICD-10-PCS | Mod: 76,,, | Performed by: INTERNAL MEDICINE

## 2023-07-26 PROCEDURE — 99153 MOD SED SAME PHYS/QHP EA: CPT | Performed by: INTERNAL MEDICINE

## 2023-07-26 PROCEDURE — C1819 TISSUE LOCALIZATION-EXCISION: HCPCS | Performed by: INTERNAL MEDICINE

## 2023-07-26 PROCEDURE — 93005 ELECTROCARDIOGRAM TRACING: CPT

## 2023-07-26 DEVICE — PACEMAKER
Type: IMPLANTABLE DEVICE | Site: CHEST | Status: FUNCTIONAL
Brand: ACCOLADE™ MRI EL DR

## 2023-07-26 RX ORDER — MIDAZOLAM HYDROCHLORIDE 1 MG/ML
INJECTION INTRAMUSCULAR; INTRAVENOUS
Status: DISCONTINUED | OUTPATIENT
Start: 2023-07-26 | End: 2023-07-26 | Stop reason: HOSPADM

## 2023-07-26 RX ORDER — SODIUM CHLORIDE 0.9 % (FLUSH) 0.9 %
10 SYRINGE (ML) INJECTION
Status: ACTIVE | OUTPATIENT
Start: 2023-07-26

## 2023-07-26 RX ORDER — CEFAZOLIN SODIUM 2 G/50ML
2 SOLUTION INTRAVENOUS
Status: DISPENSED | OUTPATIENT
Start: 2023-07-26

## 2023-07-26 RX ORDER — FENTANYL CITRATE 50 UG/ML
INJECTION, SOLUTION INTRAMUSCULAR; INTRAVENOUS
Status: DISCONTINUED | OUTPATIENT
Start: 2023-07-26 | End: 2023-07-26 | Stop reason: HOSPADM

## 2023-07-26 RX ORDER — SODIUM CHLORIDE 9 MG/ML
INJECTION, SOLUTION INTRAVENOUS CONTINUOUS
Status: DISCONTINUED | OUTPATIENT
Start: 2023-07-26 | End: 2023-07-26 | Stop reason: HOSPADM

## 2023-07-26 RX ORDER — CEPHALEXIN 500 MG/1
500 CAPSULE ORAL 3 TIMES DAILY
Qty: 30 CAPSULE | Refills: 0
Start: 2023-07-26 | End: 2023-08-05

## 2023-07-26 RX ORDER — LIDOCAINE HYDROCHLORIDE 10 MG/ML
INJECTION, SOLUTION EPIDURAL; INFILTRATION; INTRACAUDAL; PERINEURAL
Status: DISCONTINUED | OUTPATIENT
Start: 2023-07-26 | End: 2023-07-26 | Stop reason: HOSPADM

## 2023-07-26 RX ORDER — ACETAMINOPHEN 325 MG/1
650 TABLET ORAL EVERY 4 HOURS PRN
Status: DISCONTINUED | OUTPATIENT
Start: 2023-07-26 | End: 2023-07-26 | Stop reason: HOSPADM

## 2023-07-26 RX ADMIN — SODIUM CHLORIDE: 9 INJECTION, SOLUTION INTRAVENOUS at 10:07

## 2023-07-26 NOTE — INTERVAL H&P NOTE
The patient has been examined and the H&P has been reviewed:    I concur with the findings and no changes have occurred since H&P was written.    Procedure risks, benefits and alternative options discussed and understood by patient/family.    We will proceed with a pacemaker generator change out under conscious sedation.       Active Hospital Problems    Diagnosis  POA    *Pacemaker-dependent due to native cardiac rhythm insufficient to support life [I49.8, Z95.0]  Yes      Resolved Hospital Problems   No resolved problems to display.

## 2023-07-26 NOTE — DISCHARGE INSTRUCTIONS
Remove dressing in 24hrs  -No driving for two Days  -No showering/bathing for 1 week, sponge bath only, use soap and water only.   -No lotions, powders, creams around site for 1 week.  - Return to the nearest emergency room if you start running a fever; have any kind of discharge coming from the site, the site looks red or swollen.  - If site starts to bleed, lay flat on the ground, apply pressure to the site and call 911.      prescription for Keflex at the [facility].

## 2023-09-07 ENCOUNTER — PATIENT MESSAGE (OUTPATIENT)
Dept: RESEARCH | Facility: HOSPITAL | Age: 63
End: 2023-09-07
Payer: MEDICAID

## 2024-02-11 ENCOUNTER — HOSPITAL ENCOUNTER (INPATIENT)
Facility: HOSPITAL | Age: 64
LOS: 2 days | Discharge: HOME | DRG: 280 | End: 2024-02-13
Attending: EMERGENCY MEDICINE | Admitting: INTERNAL MEDICINE
Payer: COMMERCIAL

## 2024-02-11 DIAGNOSIS — I50.9 ACUTE CONGESTIVE HEART FAILURE, UNSPECIFIED HEART FAILURE TYPE: Primary | ICD-10-CM

## 2024-02-11 DIAGNOSIS — I21.4 NSTEMI (NON-ST ELEVATED MYOCARDIAL INFARCTION): ICD-10-CM

## 2024-02-11 DIAGNOSIS — R74.01 TRANSAMINITIS: ICD-10-CM

## 2024-02-11 DIAGNOSIS — R06.02 SHORTNESS OF BREATH: ICD-10-CM

## 2024-02-11 DIAGNOSIS — I50.9 CONGESTIVE HEART FAILURE (CHF): ICD-10-CM

## 2024-02-11 LAB
ALBUMIN SERPL-MCNC: 4.1 G/DL (ref 3.4–4.8)
ALBUMIN/GLOB SERPL: 1.4 RATIO (ref 1.1–2)
ALP SERPL-CCNC: 168 UNIT/L (ref 40–150)
ALT SERPL-CCNC: 57 UNIT/L (ref 0–55)
APPEARANCE UR: CLEAR
APTT PPP: 41.7 SECONDS (ref 23.2–33.7)
AST SERPL-CCNC: 42 UNIT/L (ref 5–34)
B PERT.PT PRMT NPH QL NAA+NON-PROBE: NOT DETECTED
BACTERIA #/AREA URNS AUTO: ABNORMAL /HPF
BASOPHILS # BLD AUTO: 0.02 X10(3)/MCL
BASOPHILS NFR BLD AUTO: 0.3 %
BILIRUB SERPL-MCNC: 0.7 MG/DL
BILIRUB UR QL STRIP.AUTO: NEGATIVE
BNP BLD-MCNC: 334 PG/ML
BUN SERPL-MCNC: 14.2 MG/DL (ref 9.8–20.1)
C PNEUM DNA NPH QL NAA+NON-PROBE: NOT DETECTED
CALCIUM SERPL-MCNC: 8.9 MG/DL (ref 8.4–10.2)
CHLORIDE SERPL-SCNC: 107 MMOL/L (ref 98–107)
CO2 SERPL-SCNC: 21 MMOL/L (ref 23–31)
COLOR UR AUTO: ABNORMAL
CREAT SERPL-MCNC: 0.96 MG/DL (ref 0.55–1.02)
EOSINOPHIL # BLD AUTO: 0.21 X10(3)/MCL (ref 0–0.9)
EOSINOPHIL NFR BLD AUTO: 2.7 %
ERYTHROCYTE [DISTWIDTH] IN BLOOD BY AUTOMATED COUNT: 14.4 % (ref 11.5–17)
FLUAV AG UPPER RESP QL IA.RAPID: NOT DETECTED
FLUBV AG UPPER RESP QL IA.RAPID: NOT DETECTED
GFR SERPLBLD CREATININE-BSD FMLA CKD-EPI: >60 MLS/MIN/1.73/M2
GLOBULIN SER-MCNC: 3 GM/DL (ref 2.4–3.5)
GLUCOSE SERPL-MCNC: 97 MG/DL (ref 82–115)
GLUCOSE UR QL STRIP.AUTO: NORMAL
HADV DNA NPH QL NAA+NON-PROBE: NOT DETECTED
HCOV 229E RNA NPH QL NAA+NON-PROBE: NOT DETECTED
HCOV HKU1 RNA NPH QL NAA+NON-PROBE: NOT DETECTED
HCOV NL63 RNA NPH QL NAA+NON-PROBE: NOT DETECTED
HCOV OC43 RNA NPH QL NAA+NON-PROBE: NOT DETECTED
HCT VFR BLD AUTO: 36.1 % (ref 37–47)
HGB BLD-MCNC: 11.4 G/DL (ref 12–16)
HMPV RNA NPH QL NAA+NON-PROBE: NOT DETECTED
HPIV1 RNA NPH QL NAA+NON-PROBE: NOT DETECTED
HPIV2 RNA NPH QL NAA+NON-PROBE: NOT DETECTED
HPIV3 RNA NPH QL NAA+NON-PROBE: NOT DETECTED
HPIV4 RNA NPH QL NAA+NON-PROBE: NOT DETECTED
IMM GRANULOCYTES # BLD AUTO: 0.02 X10(3)/MCL (ref 0–0.04)
IMM GRANULOCYTES NFR BLD AUTO: 0.3 %
INR PPP: 1.2
KETONES UR QL STRIP.AUTO: NEGATIVE
LACTATE SERPL-SCNC: 1.4 MMOL/L (ref 0.5–2.2)
LACTATE SERPL-SCNC: 2.2 MMOL/L (ref 0.5–2.2)
LEUKOCYTE ESTERASE UR QL STRIP.AUTO: 25
LYMPHOCYTES # BLD AUTO: 1.25 X10(3)/MCL (ref 0.6–4.6)
LYMPHOCYTES NFR BLD AUTO: 16.3 %
M PNEUMO DNA NPH QL NAA+NON-PROBE: NOT DETECTED
MCH RBC QN AUTO: 30.1 PG (ref 27–31)
MCHC RBC AUTO-ENTMCNC: 31.6 G/DL (ref 33–36)
MCV RBC AUTO: 95.3 FL (ref 80–94)
MONOCYTES # BLD AUTO: 0.75 X10(3)/MCL (ref 0.1–1.3)
MONOCYTES NFR BLD AUTO: 9.8 %
NEUTROPHILS # BLD AUTO: 5.43 X10(3)/MCL (ref 2.1–9.2)
NEUTROPHILS NFR BLD AUTO: 70.6 %
NITRITE UR QL STRIP.AUTO: NEGATIVE
NRBC BLD AUTO-RTO: 0 %
PH UR STRIP.AUTO: 5.5 [PH]
PLATELET # BLD AUTO: 118 X10(3)/MCL (ref 130–400)
PMV BLD AUTO: 10 FL (ref 7.4–10.4)
POTASSIUM SERPL-SCNC: 3.8 MMOL/L (ref 3.5–5.1)
PROT SERPL-MCNC: 7.1 GM/DL (ref 5.8–7.6)
PROT UR QL STRIP.AUTO: NEGATIVE
PROTHROMBIN TIME: 14.9 SECONDS (ref 12.5–14.5)
RBC # BLD AUTO: 3.79 X10(6)/MCL (ref 4.2–5.4)
RBC #/AREA URNS AUTO: ABNORMAL /HPF
RBC UR QL AUTO: NEGATIVE
RSV RNA NPH QL NAA+NON-PROBE: NOT DETECTED
RV+EV RNA NPH QL NAA+NON-PROBE: DETECTED
SARS-COV-2 RNA RESP QL NAA+PROBE: NOT DETECTED
SODIUM SERPL-SCNC: 139 MMOL/L (ref 136–145)
SP GR UR STRIP.AUTO: 1.01 (ref 1–1.03)
SQUAMOUS #/AREA URNS LPF: ABNORMAL /HPF
TROPONIN I SERPL-MCNC: 0.02 NG/ML (ref 0–0.04)
TROPONIN I SERPL-MCNC: 0.06 NG/ML (ref 0–0.04)
UROBILINOGEN UR STRIP-ACNC: NORMAL
WBC # SPEC AUTO: 7.68 X10(3)/MCL (ref 4.5–11.5)
WBC #/AREA URNS AUTO: ABNORMAL /HPF

## 2024-02-11 PROCEDURE — 87633 RESP VIRUS 12-25 TARGETS: CPT | Performed by: EMERGENCY MEDICINE

## 2024-02-11 PROCEDURE — 83880 ASSAY OF NATRIURETIC PEPTIDE: CPT | Performed by: PHYSICIAN ASSISTANT

## 2024-02-11 PROCEDURE — 25000003 PHARM REV CODE 250: Performed by: EMERGENCY MEDICINE

## 2024-02-11 PROCEDURE — 84484 ASSAY OF TROPONIN QUANT: CPT | Performed by: EMERGENCY MEDICINE

## 2024-02-11 PROCEDURE — 85610 PROTHROMBIN TIME: CPT | Performed by: PHYSICIAN ASSISTANT

## 2024-02-11 PROCEDURE — 99291 CRITICAL CARE FIRST HOUR: CPT

## 2024-02-11 PROCEDURE — 84484 ASSAY OF TROPONIN QUANT: CPT | Performed by: PHYSICIAN ASSISTANT

## 2024-02-11 PROCEDURE — 80053 COMPREHEN METABOLIC PANEL: CPT | Performed by: PHYSICIAN ASSISTANT

## 2024-02-11 PROCEDURE — 93010 ELECTROCARDIOGRAM REPORT: CPT | Mod: ,,, | Performed by: INTERNAL MEDICINE

## 2024-02-11 PROCEDURE — 85730 THROMBOPLASTIN TIME PARTIAL: CPT | Performed by: PHYSICIAN ASSISTANT

## 2024-02-11 PROCEDURE — 87040 BLOOD CULTURE FOR BACTERIA: CPT | Performed by: EMERGENCY MEDICINE

## 2024-02-11 PROCEDURE — 81001 URINALYSIS AUTO W/SCOPE: CPT | Performed by: PHYSICIAN ASSISTANT

## 2024-02-11 PROCEDURE — 63600175 PHARM REV CODE 636 W HCPCS: Performed by: EMERGENCY MEDICINE

## 2024-02-11 PROCEDURE — 11000001 HC ACUTE MED/SURG PRIVATE ROOM

## 2024-02-11 PROCEDURE — 85025 COMPLETE CBC W/AUTO DIFF WBC: CPT | Performed by: PHYSICIAN ASSISTANT

## 2024-02-11 PROCEDURE — 83605 ASSAY OF LACTIC ACID: CPT | Performed by: EMERGENCY MEDICINE

## 2024-02-11 PROCEDURE — 93005 ELECTROCARDIOGRAM TRACING: CPT

## 2024-02-11 PROCEDURE — 0240U COVID/FLU A&B PCR: CPT | Performed by: PHYSICIAN ASSISTANT

## 2024-02-11 RX ORDER — ASPIRIN 325 MG
325 TABLET, DELAYED RELEASE (ENTERIC COATED) ORAL
Status: COMPLETED | OUTPATIENT
Start: 2024-02-11 | End: 2024-02-11

## 2024-02-11 RX ORDER — FUROSEMIDE 10 MG/ML
20 INJECTION INTRAMUSCULAR; INTRAVENOUS
Status: COMPLETED | OUTPATIENT
Start: 2024-02-11 | End: 2024-02-11

## 2024-02-11 RX ORDER — ACETAMINOPHEN 325 MG/1
650 TABLET ORAL EVERY 8 HOURS PRN
Status: DISCONTINUED | OUTPATIENT
Start: 2024-02-11 | End: 2024-02-13 | Stop reason: HOSPADM

## 2024-02-11 RX ORDER — FAMOTIDINE 20 MG/1
20 TABLET, FILM COATED ORAL 2 TIMES DAILY
Status: DISCONTINUED | OUTPATIENT
Start: 2024-02-11 | End: 2024-02-12

## 2024-02-11 RX ORDER — POLYETHYLENE GLYCOL 3350 17 G/17G
17 POWDER, FOR SOLUTION ORAL DAILY
Status: DISCONTINUED | OUTPATIENT
Start: 2024-02-12 | End: 2024-02-13 | Stop reason: HOSPADM

## 2024-02-11 RX ORDER — ENOXAPARIN SODIUM 100 MG/ML
1 INJECTION SUBCUTANEOUS ONCE
Status: COMPLETED | OUTPATIENT
Start: 2024-02-11 | End: 2024-02-11

## 2024-02-11 RX ORDER — TALC
6 POWDER (GRAM) TOPICAL NIGHTLY PRN
Status: DISCONTINUED | OUTPATIENT
Start: 2024-02-11 | End: 2024-02-13 | Stop reason: HOSPADM

## 2024-02-11 RX ORDER — SODIUM CHLORIDE 0.9 % (FLUSH) 0.9 %
10 SYRINGE (ML) INJECTION
Status: DISCONTINUED | OUTPATIENT
Start: 2024-02-11 | End: 2024-02-13 | Stop reason: HOSPADM

## 2024-02-11 RX ORDER — ONDANSETRON HYDROCHLORIDE 2 MG/ML
4 INJECTION, SOLUTION INTRAVENOUS EVERY 8 HOURS PRN
Status: DISCONTINUED | OUTPATIENT
Start: 2024-02-11 | End: 2024-02-13 | Stop reason: HOSPADM

## 2024-02-11 RX ADMIN — ASPIRIN 325 MG: 325 TABLET, COATED ORAL at 06:02

## 2024-02-11 RX ADMIN — Medication 6 MG: at 08:02

## 2024-02-11 RX ADMIN — FAMOTIDINE 20 MG: 20 TABLET, FILM COATED ORAL at 08:02

## 2024-02-11 RX ADMIN — FUROSEMIDE 20 MG: 10 INJECTION, SOLUTION INTRAMUSCULAR; INTRAVENOUS at 06:02

## 2024-02-11 RX ADMIN — ENOXAPARIN SODIUM 90 MG: 100 INJECTION SUBCUTANEOUS at 06:02

## 2024-02-11 NOTE — ED PROVIDER NOTES
Encounter Date: 2/11/2024    SCRIBE #1 NOTE: I, Tika Sam, am scribing for, and in the presence of,  Deann Fernández MD. I have scribed the following portions of the note - the EKG reading. Other sections scribed: HPI, ROS, PE.       History     Chief Complaint   Patient presents with    Fatigue     Pt c/o weakness/dizziness/cough/congestion/body aches x2 days, fever 100.4 this AM. Seen at  yesterday and today, swabbed (-) for COVID/FLU but told to come to ER for hypotension/hypoxia. States family sick with unknown illness. Hx Br CA in remission, pacemaker/stent placement last yr. Pt weak in triage.     63 year old female with a hx of pacemaker, DENNIS on CPAP, and breast cancer in remission presents to the ED from an urgent care for reported hypotension and hypoxia. The patient reports having myalgias, cough, congestion, constant shortness of breath, fever reaching 100.4 at its highest, and worsening generalized weakness that began last night. She reports eating and drinking normally, and denies orthopnea, lower extremity swelling, weight gain, or chest pain. The patient reports that she lives with several family members who are sick, but they are undiagnosed. She tested negative for flu and covid yesterday and today at . She is taking spironolactone and reports urinating after. She used a family member's breathing treatment today that slightly improved her SOB.     The history is provided by the patient. No  was used.     Review of patient's allergies indicates:   Allergen Reactions    Codeine Itching and Nausea And Vomiting     and causing itching.   and causing itching.   and causing itching.       Sulfa (sulfonamide antibiotics) Itching and Nausea And Vomiting     Past Medical History:   Diagnosis Date    Cancer     Coronary artery disease     Depression     Hyperlipidemia     Hypertension     Sleep apnea      Past Surgical History:   Procedure Laterality Date    ATHERECTOMY   10/20/2022    Procedure: Atherectomy;  Surgeon: Aron Pereyra MD;  Location: [facility] CATH LAB;  Service: Cardiology;;    INSERT / REPLACE / REMOVE PACEMAKER      08/2023    LEFT HEART CATHETERIZATION Left 10/20/2022    Procedure: CATHETERIZATION, HEART, LEFT;  Surgeon: Aron Pereyra MD;  Location: [facility] CATH LAB;  Service: Cardiology;  Laterality: Left;  LHC +/- PCI    MASTECTOMY      REPLACEMENT OF DUAL CHAMBER PACEMAKER GENERATOR N/A 07/26/2023    Procedure: REPLACEMENT, PULSE GENERATOR OF DUAL CHAMBER PACEMAKER;  Surgeon: Feliz Yo MD;  Location: [facility] CATH LAB;  Service: Cardiology;  Laterality: N/A;  DUAL CHAMBER PPM GEN CHANGE  (BS)    TONSILLECTOMY       Family History   Problem Relation Age of Onset    Diabetes Mother     Heart disease Mother     Diabetes Father     Heart disease Father      Social History     Tobacco Use    Smoking status: Never    Smokeless tobacco: Never   Substance Use Topics    Alcohol use: Not Currently     Comment: rarely - wine    Drug use: Never     Review of Systems   Constitutional:  Positive for fever. Negative for appetite change and unexpected weight change.        Worsening weakness    HENT:  Positive for congestion. Negative for sore throat.    Eyes:  Negative for photophobia and discharge.   Respiratory:  Positive for cough and shortness of breath. Negative for wheezing.    Cardiovascular:  Negative for chest pain, palpitations and leg swelling.   Gastrointestinal:  Negative for abdominal pain, blood in stool, constipation, diarrhea, nausea and vomiting.   Genitourinary:  Negative for dysuria, frequency, hematuria and urgency.   Musculoskeletal:  Positive for myalgias. Negative for back pain.   Skin:  Negative for rash.   Neurological:  Negative for dizziness, tremors, speech difficulty, weakness and headaches.   Psychiatric/Behavioral: Negative.     All other systems reviewed and are negative.      Physical Exam     Initial Vitals [02/11/24 1423]   BP Pulse  Resp Temp SpO2   (!) 100/54 67 18 99 °F (37.2 °C) 95 %      MAP       --         Physical Exam    Nursing note and vitals reviewed.  Constitutional: She appears well-developed and well-nourished. She is not diaphoretic. No distress.   HENT:   Head: Normocephalic and atraumatic.   Nose: Nose normal.   Mouth/Throat: Oropharynx is clear and moist.   Eyes: Conjunctivae and EOM are normal. Pupils are equal, round, and reactive to light.   Neck: Trachea normal. Neck supple.   Normal range of motion.  Cardiovascular:  Normal rate, regular rhythm, normal heart sounds and intact distal pulses.           No murmur heard.  Pulmonary/Chest: No respiratory distress. She has wheezes (intermittent). She has rales. She exhibits no tenderness.   Short of breath with minimal exertion, crackles bilaterally   Abdominal: Abdomen is soft. Bowel sounds are normal. She exhibits no distension and no mass. There is no abdominal tenderness. There is no rebound and no guarding.   Musculoskeletal:         General: No tenderness or edema. Normal range of motion.      Cervical back: Normal range of motion and neck supple.      Lumbar back: Normal. Normal range of motion.     Neurological: She is alert and oriented to person, place, and time. No cranial nerve deficit or sensory deficit. GCS score is 15. GCS eye subscore is 4. GCS verbal subscore is 5. GCS motor subscore is 6.   Generally weak, but ambulatory    Skin: Skin is warm and dry. Capillary refill takes less than 2 seconds. No abscess noted. No erythema. No pallor.   Psychiatric: She has a normal mood and affect. Her behavior is normal. Judgment and thought content normal.         ED Course   Critical Care    Date/Time: 2/11/2024 6:40 PM    Performed by: Deann Fernández MD  Authorized by: Deann Fernández MD  Direct patient critical care time: 60 minutes  Total critical care time (exclusive of procedural time) : 60 minutes  Critical care was necessary to treat or prevent imminent or  life-threatening deterioration of the following conditions: cardiac failure and circulatory failure.  Critical care was time spent personally by me on the following activities: development of treatment plan with patient or surrogate, discussions with consultants, evaluation of patient's response to treatment, examination of patient, obtaining history from patient or surrogate, ordering and review of laboratory studies, ordering and performing treatments and interventions, ordering and review of radiographic studies, pulse oximetry, re-evaluation of patient's condition and review of old charts.        Labs Reviewed   COMPREHENSIVE METABOLIC PANEL - Abnormal; Notable for the following components:       Result Value    Carbon Dioxide 21 (*)     Alkaline Phosphatase 168 (*)     Alanine Aminotransferase 57 (*)     Aspartate Aminotransferase 42 (*)     All other components within normal limits   B-TYPE NATRIURETIC PEPTIDE - Abnormal; Notable for the following components:    Natriuretic Peptide 334.0 (*)     All other components within normal limits   TROPONIN I - Abnormal; Notable for the following components:    Troponin-I 0.055 (*)     All other components within normal limits   CBC WITH DIFFERENTIAL - Abnormal; Notable for the following components:    RBC 3.79 (*)     Hgb 11.4 (*)     Hct 36.1 (*)     MCV 95.3 (*)     MCHC 31.6 (*)     Platelet 118 (*)     All other components within normal limits   APTT - Abnormal; Notable for the following components:    PTT 41.7 (*)     All other components within normal limits   PROTIME-INR - Abnormal; Notable for the following components:    PT 14.9 (*)     All other components within normal limits   URINALYSIS, REFLEX TO URINE CULTURE - Abnormal; Notable for the following components:    Leukocyte Esterase, UA 25 (*)     All other components within normal limits   RESPIRATORY PANEL - Abnormal; Notable for the following components:    Human Rhinovirus/Enterovirus Detected (*)      All other components within normal limits    Narrative:     The BioFire Respiratory Panel 2.1 (RP2.1) is a PCR-based multiplexed nucleic acid test intended for use with the BioFire® 2.0 for simultaneous qualitative detection and identification of multiple respiratory viral and bacterial nucleic acids in nasopharyngeal swabs (NPS) obtained from individuals suspected of respiratory tract infections.   COVID/FLU A&B PCR - Normal    Narrative:     The Xpert Xpress SARS-CoV-2/FLU/RSV plus is a rapid, multiplexed real-time PCR test intended for the simultaneous qualitative detection and differentiation of SARS-CoV-2, Influenza A, Influenza B, and respiratory syncytial virus (RSV) viral RNA in either nasopharyngeal swab or nasal swab specimens.         LACTIC ACID, PLASMA - Normal   BLOOD CULTURE [facility]   BLOOD CULTURE [facility]   CBC W/ AUTO DIFFERENTIAL    Narrative:     The following orders were created for panel order CBC auto differential.  Procedure                               Abnormality         Status                     ---------                               -----------         ------                     CBC with Differential[[id number]]       Abnormal            Final result                 Please view results for these tests on the individual orders.   LACTIC ACID, PLASMA     EKG Readings: (Independently Interpreted)   Initial Reading: No STEMI. Rhythm: Paced Rhythm. Heart Rate: 70. Ectopy: No Ectopy. Conduction: Normal. ST Segments: Normal ST Segments. T Waves: Normal. Axis: Normal. Clinical Impression: Paced Rhythm   Done at 14:31       Imaging Results              X-Ray Chest PA And Lateral (Final result)  Result time 02/11/24 15:19:12      Final result by Abel Tee MD (02/11/24 15:19:12)                   Impression:      Mild congestive failure.      Electronically signed by: Abel Tee  Date:    02/11/2024  Time:    15:19               Narrative:    EXAMINATION:  XR CHEST PA AND LATERAL    CLINICAL  HISTORY:  Shortness of breath    TECHNIQUE:  Two-view    COMPARISON:  November 9, 2022.    FINDINGS:  Cardiopericardial silhouette is is enlarged.  Cardiac device electrodes terminate within the right atrium right ventricle.  Lungs are remarkable for subtle ground-glass and reticulonodular opacities reflective of mild congestive failure.  There is no focally dense consolidation or significant fluid within the pleural spaces.                                    X-Rays:   Independently Interpreted Readings:   Chest X-Ray: Cardiomegaly present.  Increased vascular markings consistent with CHF are present.     Medications   sodium chloride 0.9% flush 10 mL (has no administration in time range)   melatonin tablet 6 mg (has no administration in time range)   acetaminophen tablet 650 mg (has no administration in time range)   polyethylene glycol packet 17 g (has no administration in time range)   famotidine tablet 20 mg (has no administration in time range)   ondansetron injection 4 mg (has no administration in time range)   aspirin EC tablet 325 mg (325 mg Oral Given 2/11/24 1817)   enoxaparin injection 90 mg (90 mg Subcutaneous Given 2/11/24 1833)   furosemide injection 20 mg (20 mg Intravenous Given 2/11/24 1851)     Medical Decision Making  The differential diagnosis includes, but is not limited to, anemia, renal failure, CHF, pneumonia, URI   CBC, CMP, troponin, lactic acid, blood cultures, flu/COVID, EKG, BNP, chest x-ray ordered and reviewed  She was stable anemia a transaminitis that is likely due to hepatic congestion from volume overload.  Chest x-ray consistent with CHF and BNP is elevated as well.  Troponin is also elevated which is likely demand ischemia.  Does have a history of coronary artery disease.  She was afebrile without any signs of infection but does have evidence of acute CHF and NSTEMI.  Will diuresis tolerated and obtain an echocardiogram and admit to cardiology.  Given full-dose  Lovenox    Problems Addressed:  Acute congestive heart failure, unspecified heart failure type: acute illness or injury that poses a threat to life or bodily functions  Congestive heart failure (CHF): acute illness or injury that poses a threat to life or bodily functions  NSTEMI (non-ST elevated myocardial infarction): acute illness or injury that poses a threat to life or bodily functions  Shortness of breath: acute illness or injury that poses a threat to life or bodily functions  Transaminitis: acute illness or injury that poses a threat to life or bodily functions    Amount and/or Complexity of Data Reviewed  External Data Reviewed: notes.     Details: Outpatient visits for GAGE and her admission for pacemaker placement  Labs: ordered. Decision-making details documented in ED Course.  Radiology: ordered and independent interpretation performed.  ECG/medicine tests: ordered and independent interpretation performed.     Details: Initial Reading: No STEMI. Rhythm: Paced Rhythm. Heart Rate: 70. Ectopy: No Ectopy. Conduction: Normal. ST Segments: Normal ST Segments. T Waves: Normal. Axis: Normal. Clinical Impression: Paced Rhythm   Done at 14:31       Risk  OTC drugs.  Prescription drug management.  Decision regarding hospitalization.    Critical Care  Total time providing critical care: 60 minutes            Scribe Attestation:   Scribe #1: I performed the above scribed service and the documentation accurately describes the services I performed. I attest to the accuracy of the note.  Comments: Attending:   Physician Attestation Statement for Scribe #1: I, Deann Fernández MD, personally performed the services described in this documentation. All medical record entries made by the scribe were at my direction and in my presence.  I have reviewed the chart and agree that the record reflects my personal performance and is accurate and complete.        Attending Attestation:           Physician Attestation for  Scribe:  Physician Attestation Statement for Scribe #1: I, Deann Fernández MD, reviewed documentation, as scribed by Tika Sam in my presence, and it is both accurate and complete.             ED Course as of 02/11/24 1911   [place] Feb 11, 2024   1712 EKG performed at 1431 rate 70 atrial sensed ventricular paced rhythm [BS]   1752 BP(!): 96/47  Ivf ordered [BS]   1753 Lactic Acid Level: 2.2 [BS]   1803 Bernabe improved with laying flat [BS]   1803 Troponin I(!): 0.055 [BS]   1804 Paged [facility] [MM]   1807 Fluids discontinued, had not yet been started. Workup consistent with acute chf, afebrile and not tachycardic here, no leukocytosis, lft elevation likely dueto hepatic congestion [BS]   1833 Discussed with reanna bee with [facility] who accepts admission, continue asa, plavix, full dose lovenox, agrees with echo and trend trop [BS]   1840 Cardiology reports she had an RCA stent couple of years ago [BS]   1911 Human Rhinovirus/Enterovirus(!): Detected [BS]      ED Course User Index  [BS] Deann Fernández MD  [MM] Tika Sam                           Clinical Impression:  Final diagnoses:  [R06.02] Shortness of breath  [I50.9] Congestive heart failure (CHF)  [I50.9] Acute congestive heart failure, unspecified heart failure type (Primary)  [I21.4] NSTEMI (non-ST elevated myocardial infarction)  [R74.01] Transaminitis          ED Disposition Condition    Admit Stable                Deann Fernández MD  02/11/24 1842       Deann Fernández MD  02/11/24 1911

## 2024-02-11 NOTE — Clinical Note
Diagnosis: Shortness of breath [786.05.ICD-9-CM]   Future Attending Provider: SEVEN ANDRES [[id number]]   Admit to which facility:: [facility] [[id number]]   Reason for IP Medical Treatment  (Clinical interventions that can only be accomplished in the IP setting? ) :: sntemi, chf   I certify that Inpatient services for greater than or equal to 2 midnights are medically necessary:: Yes   Plans for Post-Acute care--if anticipated (pick the single best option):: A. No post acute care anticipated at this time

## 2024-02-11 NOTE — FIRST PROVIDER EVALUATION
Medical screening examination initiated.  I have conducted a focused provider triage encounter, findings are as follows:    Chief Complaint   Patient presents with    Fatigue     Pt c/o weakness/dizziness/cough/congestion/body aches x2 days, fever 100.4 this AM. Seen at  yesterday and today, swabbed (-) for COVID/FLU but told to come to ER for hypotension/hypoxia. States family sick with unknown illness. Hx Br CA in remission, pacemaker/stent placement last yr. Pt weak in triage.     Brief history of present illness:  63 y.o. female presents to the ED from urgent care for weakness, dizziness, cough, congestion, body aches and fever onset 2 days ago. Had hypotension and hypoxia at  this morning. Notes +sick contacts at home. Hx of breast cancer, in remission.     Vitals:    02/11/24 1423   BP: (!) 100/54   Pulse: 67   Resp: 18   Temp: 99 °F (37.2 °C)   TempSrc: Oral   SpO2: 95%   Weight: 86.2 kg (190 lb)       Pertinent physical exam:  Awake, alert, non-labored respirations    Brief workup plan:  labs, EKG, CXR, UA, swab     Preliminary workup initiated; this workup will be continued and followed by the physician or advanced practice provider that is assigned to the patient when roomed.

## 2024-02-12 LAB
ALBUMIN SERPL-MCNC: 3.9 G/DL (ref 3.4–4.8)
ALBUMIN/GLOB SERPL: 1.1 RATIO (ref 1.1–2)
ALP SERPL-CCNC: 167 UNIT/L (ref 40–150)
ALT SERPL-CCNC: 55 UNIT/L (ref 0–55)
AST SERPL-CCNC: 43 UNIT/L (ref 5–34)
AV INDEX (PROSTH): 0.45
AV MEAN GRADIENT: 10 MMHG
AV PEAK GRADIENT: 18 MMHG
AV VALVE AREA BY VELOCITY RATIO: 1.25 CM²
AV VALVE AREA: 1.29 CM²
AV VELOCITY RATIO: 0.44
BASOPHILS # BLD AUTO: 0.02 X10(3)/MCL
BASOPHILS NFR BLD AUTO: 0.3 %
BILIRUB SERPL-MCNC: 0.7 MG/DL
BUN SERPL-MCNC: 14.7 MG/DL (ref 9.8–20.1)
CALCIUM SERPL-MCNC: 9.3 MG/DL (ref 8.4–10.2)
CHLORIDE SERPL-SCNC: 103 MMOL/L (ref 98–107)
CO2 SERPL-SCNC: 24 MMOL/L (ref 23–31)
CREAT SERPL-MCNC: 0.9 MG/DL (ref 0.55–1.02)
CV ECHO LV RWT: 0.59 CM
DOP CALC AO PEAK VEL: 2.15 M/S
DOP CALC AO VTI: 43.2 CM
DOP CALC LVOT AREA: 2.8 CM2
DOP CALC LVOT DIAMETER: 1.9 CM
DOP CALC LVOT PEAK VEL: 0.95 M/S
DOP CALC LVOT STROKE VOLUME: 55.54 CM3
DOP CALC MV VTI: 29.3 CM
DOP CALCLVOT PEAK VEL VTI: 19.6 CM
E WAVE DECELERATION TIME: 188 MSEC
E/A RATIO: 1.18
E/E' RATIO: 10 M/S
ECHO LV POSTERIOR WALL: 1.3 CM (ref 0.6–1.1)
EOSINOPHIL # BLD AUTO: 0.22 X10(3)/MCL (ref 0–0.9)
EOSINOPHIL NFR BLD AUTO: 3.4 %
ERYTHROCYTE [DISTWIDTH] IN BLOOD BY AUTOMATED COUNT: 14.4 % (ref 11.5–17)
FRACTIONAL SHORTENING: 30 % (ref 28–44)
GFR SERPLBLD CREATININE-BSD FMLA CKD-EPI: >60 MLS/MIN/1.73/M2
GLOBULIN SER-MCNC: 3.7 GM/DL (ref 2.4–3.5)
GLUCOSE SERPL-MCNC: 105 MG/DL (ref 82–115)
HCT VFR BLD AUTO: 40 % (ref 37–47)
HGB BLD-MCNC: 12.4 G/DL (ref 12–16)
IMM GRANULOCYTES # BLD AUTO: 0.02 X10(3)/MCL (ref 0–0.04)
IMM GRANULOCYTES NFR BLD AUTO: 0.3 %
INTERVENTRICULAR SEPTUM: 1.2 CM (ref 0.6–1.1)
LEFT ATRIUM SIZE: 3.5 CM
LEFT ATRIUM VOLUME MOD: 42.2 CM3
LEFT INTERNAL DIMENSION IN SYSTOLE: 3.1 CM (ref 2.1–4)
LEFT VENTRICLE DIASTOLIC VOLUME: 87.69 ML
LEFT VENTRICLE SYSTOLIC VOLUME: 37.92 ML
LEFT VENTRICULAR INTERNAL DIMENSION IN DIASTOLE: 4.4 CM (ref 3.5–6)
LEFT VENTRICULAR MASS: 203.05 G
LV LATERAL E/E' RATIO: 10.63 M/S
LV SEPTAL E/E' RATIO: 9.44 M/S
LVOT MG: 2 MMHG
LVOT MV: 0.61 CM/S
LYMPHOCYTES # BLD AUTO: 1.34 X10(3)/MCL (ref 0.6–4.6)
LYMPHOCYTES NFR BLD AUTO: 20.6 %
MCH RBC QN AUTO: 30 PG (ref 27–31)
MCHC RBC AUTO-ENTMCNC: 31 G/DL (ref 33–36)
MCV RBC AUTO: 96.9 FL (ref 80–94)
MONOCYTES # BLD AUTO: 0.71 X10(3)/MCL (ref 0.1–1.3)
MONOCYTES NFR BLD AUTO: 10.9 %
MV MEAN GRADIENT: 2 MMHG
MV PEAK A VEL: 0.72 M/S
MV PEAK E VEL: 0.85 M/S
MV PEAK GRADIENT: 4 MMHG
MV STENOSIS PRESSURE HALF TIME: 54 MS
MV VALVE AREA BY CONTINUITY EQUATION: 1.9 CM2
MV VALVE AREA P 1/2 METHOD: 4.07 CM2
NEUTROPHILS # BLD AUTO: 4.21 X10(3)/MCL (ref 2.1–9.2)
NEUTROPHILS NFR BLD AUTO: 64.5 %
NRBC BLD AUTO-RTO: 0 %
OHS QRS DURATION: 152 MS
OHS QTC CALCULATION: 501 MS
PISA TR MAX VEL: 1.77 M/S
PLATELET # BLD AUTO: 123 X10(3)/MCL (ref 130–400)
PMV BLD AUTO: 9.7 FL (ref 7.4–10.4)
POTASSIUM SERPL-SCNC: 3.6 MMOL/L (ref 3.5–5.1)
PROT SERPL-MCNC: 7.6 GM/DL (ref 5.8–7.6)
PV PEAK GRADIENT: 5 MMHG
PV PEAK VELOCITY: 1.12 M/S
RBC # BLD AUTO: 4.13 X10(6)/MCL (ref 4.2–5.4)
RIGHT VENTRICULAR END-DIASTOLIC DIMENSION: 2.4 CM
SODIUM SERPL-SCNC: 139 MMOL/L (ref 136–145)
TDI LATERAL: 0.08 M/S
TDI SEPTAL: 0.09 M/S
TDI: 0.09 M/S
TR MAX PG: 13 MMHG
TRICUSPID ANNULAR PLANE SYSTOLIC EXCURSION: 0.87 CM
TROPONIN I SERPL-MCNC: 0.01 NG/ML (ref 0–0.04)
TROPONIN I SERPL-MCNC: 0.02 NG/ML (ref 0–0.04)
TROPONIN I SERPL-MCNC: 0.04 NG/ML (ref 0–0.04)
WBC # SPEC AUTO: 6.52 X10(3)/MCL (ref 4.5–11.5)

## 2024-02-12 PROCEDURE — 84484 ASSAY OF TROPONIN QUANT: CPT | Performed by: EMERGENCY MEDICINE

## 2024-02-12 PROCEDURE — 85025 COMPLETE CBC W/AUTO DIFF WBC: CPT | Performed by: EMERGENCY MEDICINE

## 2024-02-12 PROCEDURE — 63600175 PHARM REV CODE 636 W HCPCS: Performed by: NURSE PRACTITIONER

## 2024-02-12 PROCEDURE — 25000003 PHARM REV CODE 250: Performed by: EMERGENCY MEDICINE

## 2024-02-12 PROCEDURE — 80053 COMPREHEN METABOLIC PANEL: CPT | Performed by: EMERGENCY MEDICINE

## 2024-02-12 PROCEDURE — 11000001 HC ACUTE MED/SURG PRIVATE ROOM

## 2024-02-12 PROCEDURE — 21400001 HC TELEMETRY ROOM

## 2024-02-12 PROCEDURE — 25000003 PHARM REV CODE 250: Performed by: NURSE PRACTITIONER

## 2024-02-12 PROCEDURE — 27000207 HC ISOLATION

## 2024-02-12 RX ORDER — METOPROLOL SUCCINATE 25 MG/1
25 TABLET, EXTENDED RELEASE ORAL DAILY
Status: DISCONTINUED | OUTPATIENT
Start: 2024-02-12 | End: 2024-02-13 | Stop reason: HOSPADM

## 2024-02-12 RX ORDER — PANTOPRAZOLE SODIUM 40 MG/1
40 TABLET, DELAYED RELEASE ORAL DAILY
Status: DISCONTINUED | OUTPATIENT
Start: 2024-02-12 | End: 2024-02-13 | Stop reason: HOSPADM

## 2024-02-12 RX ORDER — FUROSEMIDE 10 MG/ML
40 INJECTION INTRAMUSCULAR; INTRAVENOUS EVERY 12 HOURS
Status: DISCONTINUED | OUTPATIENT
Start: 2024-02-12 | End: 2024-02-13

## 2024-02-12 RX ORDER — GABAPENTIN 300 MG/1
300 CAPSULE ORAL 3 TIMES DAILY
Status: DISCONTINUED | OUTPATIENT
Start: 2024-02-12 | End: 2024-02-13 | Stop reason: HOSPADM

## 2024-02-12 RX ORDER — PANTOPRAZOLE SODIUM 40 MG/1
40 TABLET, DELAYED RELEASE ORAL DAILY
COMMUNITY

## 2024-02-12 RX ORDER — EZETIMIBE 10 MG/1
10 TABLET ORAL DAILY
COMMUNITY

## 2024-02-12 RX ORDER — CLOPIDOGREL BISULFATE 75 MG/1
75 TABLET ORAL DAILY
Status: DISCONTINUED | OUTPATIENT
Start: 2024-02-12 | End: 2024-02-13 | Stop reason: HOSPADM

## 2024-02-12 RX ORDER — FUROSEMIDE 20 MG/1
20 TABLET ORAL DAILY
COMMUNITY

## 2024-02-12 RX ORDER — TRAZODONE HYDROCHLORIDE 50 MG/1
50 TABLET ORAL NIGHTLY
Status: DISCONTINUED | OUTPATIENT
Start: 2024-02-12 | End: 2024-02-13 | Stop reason: HOSPADM

## 2024-02-12 RX ORDER — ATORVASTATIN CALCIUM 40 MG/1
40 TABLET, FILM COATED ORAL DAILY
Status: DISCONTINUED | OUTPATIENT
Start: 2024-02-12 | End: 2024-02-13 | Stop reason: HOSPADM

## 2024-02-12 RX ORDER — ASPIRIN 81 MG/1
81 TABLET ORAL NIGHTLY
Status: DISCONTINUED | OUTPATIENT
Start: 2024-02-12 | End: 2024-02-13 | Stop reason: HOSPADM

## 2024-02-12 RX ADMIN — GABAPENTIN 300 MG: 300 CAPSULE ORAL at 08:02

## 2024-02-12 RX ADMIN — GABAPENTIN 300 MG: 300 CAPSULE ORAL at 04:02

## 2024-02-12 RX ADMIN — PANTOPRAZOLE SODIUM 40 MG: 40 TABLET, DELAYED RELEASE ORAL at 11:02

## 2024-02-12 RX ADMIN — FUROSEMIDE 40 MG: 10 INJECTION, SOLUTION INTRAMUSCULAR; INTRAVENOUS at 04:02

## 2024-02-12 RX ADMIN — FAMOTIDINE 20 MG: 20 TABLET, FILM COATED ORAL at 09:02

## 2024-02-12 RX ADMIN — CLOPIDOGREL BISULFATE 75 MG: 75 TABLET ORAL at 11:02

## 2024-02-12 RX ADMIN — PAROXETINE HYDROCHLORIDE 30 MG: 20 TABLET, FILM COATED ORAL at 11:02

## 2024-02-12 RX ADMIN — Medication 81 MG: at 08:02

## 2024-02-12 RX ADMIN — TRAZODONE HYDROCHLORIDE 50 MG: 50 TABLET ORAL at 08:02

## 2024-02-12 RX ADMIN — POLYETHYLENE GLYCOL 3350 17 G: 17 POWDER, FOR SOLUTION ORAL at 09:02

## 2024-02-12 RX ADMIN — ATORVASTATIN CALCIUM 40 MG: 40 TABLET, FILM COATED ORAL at 11:02

## 2024-02-12 NOTE — H&P
[facility]    Cardiology  History and Physical     Patient Name: Donna Graham  MRN: [id number]  Admission Date: 2/11/2024  Code Status: Full Code   Attending Provider: Abbe Cummings MD   Primary Care Physician: Catie Jiang NP  Principal Problem:<principal problem not specified>    Patient information was obtained from patient, past medical records, and ER records.     Subjective:     Chief Complaint: Fatigue     HPI: Ms. Graham is a 64 y/o male who is known to [facility], Dr. Gerber. The patient presented to [facility] on 2.11.24 with c/o Fatigue, Dizziness, Cough, Body Aches and Subjective Fever. The patient was evaluated at a Local Urgent Care and was Swabbed for COVID-19/Flut which were Unremarkable. She was instructed to present to the ER for evaluation of Hypotension/Hypoxemia. She reported that the symptoms started about 2 days prior to presentation. She reported PND and 2-3 Pillow Orthopnea. She Upon evaluation he was found to have a .0, Troponin 0.055, Lactic Acid 1.4 and Human Rhinovirus/Enterovirus and CXR with Pulmonary Vascular Congestion. He was started on IV Lasix for HF.  He was admitted to [facility] for HF Exacerbation and Minimally Elevated Troponin.     PMH: CAD/Stents, Depression, HLD, HTN, DENNIS/CPAP, Breast Cancer, Anxiety, GERD   PSH: Angiograms, Dual Chamber PPM/[employer], Mastectomy, Generator Change, Tonsillectomy  Family History: Father, L, HTN/HHD, CAD, DM II; Mother, L, CAD, DM II  Social History: Denies Illicit Drug, ETOH and Tobacco Use     Previous Cardiac Diagnostics:   ECHO 10.20.22:  Left main coronary artery:  Patent  Left anterior descending artery:  Luminal irregularities  Left circumflex artery:  Luminal irregularities  Right coronary artery:  Dominant.  90% ostial stenosis.  0% residual disease after stenting.  LVEDP: 16 mm Hg  No significant transvalvular aortic gradient by pullback method  Assessment:  Successful shockwave lithotripsy, PTCA and PCI utilizing a  4x15 mm Reji stent with 0% residual disease.  Plan:  -Continue dual anti-platelet therapy for minimum of 6-12 months.  Lifelong aspirin.  -Optimization of CAD with guideline directed medical therapy  -Follow-up with Dr. Gerber in 1 week from the procedure    ECHO 10.4.22:  The study quality is average.   The left ventricle is normal in size.  Global left ventricular systolic function is normal. The left ventricular ejection fraction is 55%. Left ventricular diastolic function is normal.   Mild to moderate (1-2+) tricuspid regurgitation.  Mild (1+) mitral regurgitation  The pulmonary artery systolic pressure is 35 mmHg.   An intracardiac device wire is visualized in the right heart.     PET 9.22.22:  This is a normal perfusion study, no perfusion defects noted. There is no evidence of ischemia.   This scan is suggestive of low risk for future cardiovascular events.   The left ventricular cavity is noted to be normal on the stress studies. The stress left ventricular ejection fraction was calculated to be 74% and left ventricular global function is normal. The rest left ventricular cavity is noted to be normal. The rest left ventricular ejection fraction was calculated to be 75% and rest left ventricular global function is normal.   When compared to the resting ejection fraction (75%), the stress ejection fraction (74%) has decreased.   The study quality is average.   There was a rise in myocardial blood flow between rest and stress.  Global myocardial blood flow reserve was 1.55.  Myocardial blood flow reserve is globally abnormal, placing the patient at a higher coronary event risk.    CAC Score 12.1.20:  Total 15    Review of patient's allergies indicates:   Allergen Reactions    Codeine Itching and Nausea And Vomiting     and causing itching.   and causing itching.   and causing itching.       Sulfa (sulfonamide antibiotics) Itching and Nausea And Vomiting     Current Facility-Administered Medications on File Prior  to Encounter   Medication    cefazolin (ANCEF) 2 gram in dextrose 5% 50 mL IVPB (premix)    sodium chloride 0.9% flush 10 mL     Current Outpatient Medications on File Prior to Encounter   Medication Sig    aspirin (ECOTRIN) 81 MG EC tablet Take 81 mg by mouth nightly.    clopidogreL (PLAVIX) 75 mg tablet Take 1 tablet (75 mg total) by mouth once daily.    cyclobenzaprine (FLEXERIL) 5 MG tablet Take 5 mg by mouth daily as needed.    gabapentin (NEURONTIN) 300 MG capsule TAKE 1 CAPSULE(300 MG) BY MOUTH THREE TIMES DAILY    lisinopriL (PRINIVIL,ZESTRIL) 20 MG tablet Take 1 tablet (20 mg total) by mouth once daily.    meloxicam (MOBIC) 15 MG tablet TAKE 1 TABLET BY MOUTH EVERY DAY AS NEEDED FOR PAIN WITH FOOD    metoprolol succinate (TOPROL-XL) 25 MG 24 hr tablet Take 25 mg by mouth once daily.    nitroGLYCERIN (NITROSTAT) 0.4 MG SL tablet Place under the tongue.    omeprazole (PRILOSEC OTC) 20 MG tablet Take 1 tablet (20 mg total) by mouth every morning.    paroxetine (PAXIL) 30 MG tablet Take 1 tablet (30 mg total) by mouth every morning.    rosuvastatin (CRESTOR) 10 MG tablet Take 1 tablet (10 mg total) by mouth nightly.    spironolactone (ALDACTONE) 25 MG tablet Take 1 tablet (25 mg total) by mouth once daily. (Patient taking differently: Take 12.5 mg by mouth once daily. Take 1/2 tablet by mouth every day)    traZODone (DESYREL) 50 MG tablet Take 1 tablet (50 mg total) by mouth nightly.     Review of Systems   Constitutional: Positive for fever and malaise/fatigue.   Cardiovascular:  Positive for dyspnea on exertion. Negative for chest pain and leg swelling.   Respiratory:  Positive for shortness of breath.    All other systems reviewed and are negative.    Objective:     Vital Signs (Most Recent):  Temp: 99 °F (37.2 °C) (02/11/24 1423)  Pulse: 65 (02/12/24 0912)  Resp: 16 (02/12/24 0912)  BP: 109/61 (02/12/24 0912)  SpO2: (!) 94 % (02/12/24 0912) Vital Signs (24h Range):  Temp:  [99 °F (37.2 °C)] 99 °F (37.2  °C)  Pulse:  [61-67] 65  Resp:  [16-28] 16  SpO2:  [93 %-98 %] 94 %  BP: ()/(35-71) 109/61     Weight: 86.2 kg (190 lb)  Body mass index is 34.74 kg/m².    SpO2: (!) 94 %       No intake or output data in the 24 hours ending 02/12/24 0943    Lines/Drains/Airways       Peripheral Intravenous Line  Duration                  Peripheral IV - Single Lumen 02/11/24 1811 20 G Left Antecubital <1 day                  Physical Exam  Constitutional:       General: She is not in acute distress.     Appearance: Normal appearance. She is ill-appearing.   HENT:      Head: Normocephalic.      Mouth/Throat:      Mouth: Mucous membranes are moist.   Eyes:      Extraocular Movements: Extraocular movements intact.      Conjunctiva/sclera: Conjunctivae normal.   Cardiovascular:      Rate and Rhythm: Normal rate and regular rhythm.      Heart sounds: Murmur heard.   Pulmonary:      Effort: Pulmonary effort is normal. No respiratory distress.      Breath sounds: Rhonchi present.      Comments: NC O2  Abdominal:      Palpations: Abdomen is soft.   Musculoskeletal:         General: No swelling. Normal range of motion.      Right lower leg: No edema.      Left lower leg: No edema.   Skin:     General: Skin is warm and dry.      Capillary Refill: Capillary refill takes less than 2 seconds.   Neurological:      General: No focal deficit present.      Mental Status: She is alert and oriented to person, place, and time.   Psychiatric:         Mood and Affect: Mood normal.         Behavior: Behavior normal.         Judgment: Judgment normal.       EKG:       Telemetry: VPaced    Significant Labs: BMP:   Recent Labs   Lab 02/11/24  1719 02/12/24  0447    139   K 3.8 3.6   CO2 21* 24   BUN 14.2 14.7   CREATININE 0.96 0.90   CALCIUM 8.9 9.3   , CMP   Recent Labs   Lab 02/11/24  1719 02/12/24  0447    139   K 3.8 3.6   CO2 21* 24   BUN 14.2 14.7   CREATININE 0.96 0.90   CALCIUM 8.9 9.3   ALBUMIN 4.1 3.9   BILITOT 0.7 0.7   ALKPHOS  "168* 167*   AST 42* 43*   ALT 57* 55   , CBC   Recent Labs   Lab 02/11/24 1719 02/12/24 0447   WBC 7.68 6.52   HGB 11.4* 12.4   HCT 36.1* 40.0   * 123*   , INR   Recent Labs   Lab 02/11/24 1719   INR 1.2   PROTIME 14.9*   , Lipid Panel No results for input(s): "CHOL", "HDL", "LDLCALC", "TRIG", "CHOLHDL" in the last 48 hours., Troponin   Recent Labs   Lab 02/11/24 1719 02/11/24 2218 02/12/24 0447   TROPONINI 0.055* 0.020 0.039   , All pertinent lab results from the last 24 hours have been reviewed., and   Recent Lab Results  (Last 5 results in the past 24 hours)        02/12/24 0447 02/11/24 2218 02/11/24  1932   02/11/24  1731   02/11/24 1719        Human Rhinovirus/Enterovirus       Detected         Parainfluenza Virus 1       Not Detected         Parainfluenza Virus 2       Not Detected         Parainfluenza Virus 3       Not Detected         Parainfluenza Virus 4       Not Detected         Albumin/Globulin Ratio 1.1         1.4       ADENOVIRUS       Not Detected         Albumin 3.9         4.1                168       ALT 55         57       PTT         41.7  Comment: For Minimal Heparin Infusion, the goal aPTT 64-85 seconds corresponds to an anti-Xa of 0.3-0.5.    For Low Intensity and High Intensity Heparin, the goal aPTT  seconds corresponds to an anti-Xa of 0.3-0.7       AST 43         42       Baso # 0.02         0.02       Basophil % 0.3         0.3       BILIRUBIN TOTAL 0.7         0.7       BNP         334.0       BORDETELLA PARAPERTUSSIS ([id number])       Not Detected         Bordetella pertussis (ptxP)       Not Detected         BUN 14.7         14.2       Calcium 9.3         8.9       CHLAMYDIA PNEUMONIAE       Not Detected         Chloride 103         107       CO2 24         21       Coronavirus 229E       Not Detected         Coronavirus HKU1       Not Detected         Coronavirus NL63       Not Detected         Coronavirus OC43 PCR, Common Cold Virus       Not " Detected         Creatinine 0.90         0.96       eGFR >60         >60       Eos # 0.22         0.21       Eos % 3.4         2.7       Globulin, Total 3.7         3.0       Glucose 105         97       Hematocrit 40.0         36.1       Hemoglobin 12.4         11.4       Human Metapneumovirus       Not Detected         Immature Grans (Abs) 0.02         0.02       Immature Granulocytes 0.3         0.3       INR         1.2       Lactic Acid Level     1.4     2.2       Lymph # 1.34         1.25       LYMPH % 20.6         16.3       MCH 30.0         30.1       MCHC 31.0         31.6       MCV 96.9         95.3       Mono # 0.71         0.75       Mono % 10.9         9.8       MPV 9.7         10.0       MYCOPLASMA PNEUMONIAE       Not Detected         Neut # 4.21         5.43       Neut % 64.5         70.6       nRBC 0.0         0.0       Platelet Count 123         118       Potassium 3.6         3.8       PROTEIN TOTAL 7.6         7.1       PT         14.9       RBC 4.13         3.79       RDW 14.4         14.4       Sodium 139         139       Troponin I 0.039  Comment: Troponin failed Delta Check, make courtesy call to provider   0.020       0.055       WBC 6.52         7.68                            Significant Imaging: X-Ray: CXR: X-Ray Chest 1 View (CXR): No results found for this visit on 02/11/24. and X-Ray Chest PA and Lateral (CXR):   Results for orders placed or performed during the hospital encounter of 02/11/24   X-Ray Chest PA And Lateral    Narrative    EXAMINATION:  XR CHEST PA AND LATERAL    CLINICAL HISTORY:  Shortness of breath    TECHNIQUE:  Two-view    COMPARISON:  November 9, 2022.    FINDINGS:  Cardiopericardial silhouette is is enlarged.  Cardiac device electrodes terminate within the right atrium right ventricle.  Lungs are remarkable for subtle ground-glass and reticulonodular opacities reflective of mild congestive failure.  There is no focally dense consolidation or significant fluid  within the pleural spaces.      Impression    Mild congestive failure.      Electronically signed by: Abel Tee  Date:    02/11/2024  Time:    15:19     Assessment and Plan:   Acute on Chronic Diastolic HF/EF 55%  NSTEMI - Type II in the Setting of HF    - Peaked Troponin 0.055  Rhinovirus  CAD/Stents  HTN  HLD  Depression  DENNIS/CPAP  Breast Cancer  Anxiety  GERD  No Hx of GIB     PLAN:  Isolation Precautions   Trend Cardiac Biomarkers   Resume Home Meds: ASA, Statin, Plavix, BB  Accurate I&Os and Daily Weights  ECHO Today  Labs and EKG in AM: CBC, CMP and Mg    VTE Risk Mitigation (From admission, onward)           Ordered     IP VTE HIGH RISK PATIENT  Once         02/11/24 1843     Place sequential compression device  Until discontinued         02/11/24 1843                  RADHA Floers  Cardiology  [facility]  02/12/2024

## 2024-02-12 NOTE — PROGRESS NOTES
Acute CHF  Min omer fink      From Dr Johanna LANCE note:    Studies:    Ca score 12/20: 15 (75%tile)    PET 12/20: normal    Echo 12/20: 65%, m/m TR    PET 9/22: normal perfusion, some BFR reduction globally    Echo 10/22: 55%, m/m TR, mild MR    LHC 10/22: 90% ostial RCA (shockwave and GRACY placed)    CXR 11/22: normal    Labs 7/23: CMP ok, , ApoB 104      CAD/PCI    GRACY to RCA in 10/22    CP is better, SOB present (but did improve with some diuresis)      I'm not seeing a clear cardiac reason for her to be more SOB, NTG didn't help    ICR done and she did well, SOB improved  HTN  HLP    Uncontrolled, will adjust meds  PPM, [employer]  Breast cancer, had surgery and hormone Rx  DENNIS/CPAP  Depression/Anxiety  GERD  NSVT, some noted on PPM check  Elev

## 2024-02-12 NOTE — NURSING
Nurses Note -- 4 Eyes      2/12/2024   4:09 PM      Skin assessed during: Admit      [x] No Altered Skin Integrity Present    []Prevention Measures Documented      [] Yes- Altered Skin Integrity Present or Discovered   [] LDA Added if Not in Epic (Describe Wound)   [] New Altered Skin Integrity was Present on Admit and Documented in LDA   [] Wound Image Taken    Wound Care Consulted? No    Attending Nurse:  Janet Mccarthy RN/Staff Member:  [place]

## 2024-02-13 VITALS
RESPIRATION RATE: 20 BRPM | BODY MASS INDEX: 35.62 KG/M2 | HEIGHT: 62 IN | SYSTOLIC BLOOD PRESSURE: 146 MMHG | HEART RATE: 65 BPM | OXYGEN SATURATION: 96 % | DIASTOLIC BLOOD PRESSURE: 79 MMHG | TEMPERATURE: 98 F | WEIGHT: 193.56 LBS

## 2024-02-13 PROBLEM — I50.9 CONGESTIVE HEART FAILURE (CHF): Status: ACTIVE | Noted: 2024-02-13

## 2024-02-13 LAB
ANION GAP SERPL CALC-SCNC: 14 MEQ/L
BASOPHILS # BLD AUTO: 0.02 X10(3)/MCL
BASOPHILS NFR BLD AUTO: 0.4 %
BUN SERPL-MCNC: 15.2 MG/DL (ref 9.8–20.1)
CALCIUM SERPL-MCNC: 9.3 MG/DL (ref 8.4–10.2)
CHLORIDE SERPL-SCNC: 100 MMOL/L (ref 98–107)
CO2 SERPL-SCNC: 22 MMOL/L (ref 23–31)
CREAT SERPL-MCNC: 1 MG/DL (ref 0.55–1.02)
CREAT/UREA NIT SERPL: 15
EOSINOPHIL # BLD AUTO: 0.18 X10(3)/MCL (ref 0–0.9)
EOSINOPHIL NFR BLD AUTO: 3.5 %
ERYTHROCYTE [DISTWIDTH] IN BLOOD BY AUTOMATED COUNT: 14 % (ref 11.5–17)
GFR SERPLBLD CREATININE-BSD FMLA CKD-EPI: >60 MLS/MIN/1.73/M2
GLUCOSE SERPL-MCNC: 140 MG/DL (ref 82–115)
HCT VFR BLD AUTO: 38.3 % (ref 37–47)
HGB BLD-MCNC: 12.4 G/DL (ref 12–16)
IMM GRANULOCYTES # BLD AUTO: 0.02 X10(3)/MCL (ref 0–0.04)
IMM GRANULOCYTES NFR BLD AUTO: 0.4 %
LYMPHOCYTES # BLD AUTO: 1.08 X10(3)/MCL (ref 0.6–4.6)
LYMPHOCYTES NFR BLD AUTO: 20.8 %
MAGNESIUM SERPL-MCNC: 2.1 MG/DL (ref 1.6–2.6)
MCH RBC QN AUTO: 30 PG (ref 27–31)
MCHC RBC AUTO-ENTMCNC: 32.4 G/DL (ref 33–36)
MCV RBC AUTO: 92.5 FL (ref 80–94)
MONOCYTES # BLD AUTO: 0.45 X10(3)/MCL (ref 0.1–1.3)
MONOCYTES NFR BLD AUTO: 8.7 %
NEUTROPHILS # BLD AUTO: 3.45 X10(3)/MCL (ref 2.1–9.2)
NEUTROPHILS NFR BLD AUTO: 66.2 %
NRBC BLD AUTO-RTO: 0 %
PLATELET # BLD AUTO: 160 X10(3)/MCL (ref 130–400)
PMV BLD AUTO: 9.9 FL (ref 7.4–10.4)
POTASSIUM SERPL-SCNC: 3.7 MMOL/L (ref 3.5–5.1)
RBC # BLD AUTO: 4.14 X10(6)/MCL (ref 4.2–5.4)
SODIUM SERPL-SCNC: 136 MMOL/L (ref 136–145)
WBC # SPEC AUTO: 5.2 X10(3)/MCL (ref 4.5–11.5)

## 2024-02-13 PROCEDURE — 80048 BASIC METABOLIC PNL TOTAL CA: CPT

## 2024-02-13 PROCEDURE — 83735 ASSAY OF MAGNESIUM: CPT

## 2024-02-13 PROCEDURE — 25000003 PHARM REV CODE 250: Performed by: NURSE PRACTITIONER

## 2024-02-13 PROCEDURE — 63600175 PHARM REV CODE 636 W HCPCS: Performed by: NURSE PRACTITIONER

## 2024-02-13 PROCEDURE — 85025 COMPLETE CBC W/AUTO DIFF WBC: CPT

## 2024-02-13 RX ORDER — OMEPRAZOLE 20 MG/1
20 TABLET, DELAYED RELEASE ORAL EVERY MORNING
Qty: 30 TABLET | Refills: 3 | Status: SHIPPED | OUTPATIENT
Start: 2024-02-13

## 2024-02-13 RX ADMIN — GABAPENTIN 300 MG: 300 CAPSULE ORAL at 09:02

## 2024-02-13 RX ADMIN — CLOPIDOGREL BISULFATE 75 MG: 75 TABLET ORAL at 09:02

## 2024-02-13 RX ADMIN — FUROSEMIDE 40 MG: 10 INJECTION, SOLUTION INTRAMUSCULAR; INTRAVENOUS at 09:02

## 2024-02-13 RX ADMIN — METOPROLOL SUCCINATE 25 MG: 25 TABLET, EXTENDED RELEASE ORAL at 09:02

## 2024-02-13 RX ADMIN — GABAPENTIN 300 MG: 300 CAPSULE ORAL at 02:02

## 2024-02-13 RX ADMIN — PAROXETINE HYDROCHLORIDE 30 MG: 20 TABLET, FILM COATED ORAL at 07:02

## 2024-02-13 RX ADMIN — PANTOPRAZOLE SODIUM 40 MG: 40 TABLET, DELAYED RELEASE ORAL at 09:02

## 2024-02-13 RX ADMIN — ATORVASTATIN CALCIUM 40 MG: 40 TABLET, FILM COATED ORAL at 09:02

## 2024-02-13 NOTE — DISCHARGE SUMMARY
"[facility] - [facility] Telemetry    Cardiology  Discharge Summary      Patient Name: Donna Graham  MRN: [id number]  Admission Date: 2/11/2024  Hospital Length of Stay: 2 days  Discharge Date and Time:  02/13/2024 3:34 PM  Attending Physician: Abbe Cummings MD  Discharging Provider: CLAUDIO Harvey  Primary Care Physician: Catie Jiang NP    HPI: Ms. Graham is a 62 y/o male who is known to [facility], Dr. Gerber. The patient presented to [facility] on 2.11.24 with c/o Fatigue, Dizziness, Cough, Body Aches and Subjective Fever. The patient was evaluated at a Local Urgent Care and was Swabbed for COVID-19/Flut which were Unremarkable. She was instructed to present to the ER for evaluation of Hypotension/Hypoxemia. She reported that the symptoms started about 2 days prior to presentation. She reported PND and 2-3 Pillow Orthopnea. She Upon evaluation he was found to have a .0, Troponin 0.055, Lactic Acid 1.4 and Human Rhinovirus/Enterovirus and CXR with Pulmonary Vascular Congestion. She was started on IV Lasix for HF.  He was admitted to [facility] for HF Exacerbation and Minimally Elevated Troponin.      Hospital Course:   2.13.24: NAD. VSS. No complaints of CP/Palps. Reports improvement is SOB "I feel okay"    PMH: CAD/Stents, Depression, HLD, HTN, DENNIS/CPAP, Breast Cancer, Anxiety, GERD   PSH: Angiograms, Dual Chamber PPM/[employer], Mastectomy, Generator Change, Tonsillectomy  Family History: Father, L, HTN/HHD, CAD, DM II; Mother, L, CAD, DM II  Social History: Denies Illicit Drug, ETOH and Tobacco Use      Previous Cardiac Diagnostics:   ECHO (2.12.24):  Technically difficult study, No Definity contrast is used in this study. Left Ventricle: The left ventricle is normal in size. There is mild concentric hypertrophy. Normal wall motion. There is hyperdynamic systolic function with a visually estimated ejection fraction of 70 - 75%. Grade I diastolic dysfunction. Right Ventricle: Normal right " ventricular cavity size. Systolic function is normal. Mitral Valve: There is no stenosis. The mean pressure gradient across the mitral valve is 2 mmHg at a heart rate of  bpm. Pulmonic Valve: There is no stenosis. Increased velocity across aortic valve and LVOT which is probably related to hyperdynamic LV function. Cine images suggests no hemodynamically significant aortic stenosis. No other significant valvular abnormalities noted in this study.     ECHO 10.20.22:  Left main coronary artery:  Patent  Left anterior descending artery:  Luminal irregularities  Left circumflex artery:  Luminal irregularities  Right coronary artery:  Dominant.  90% ostial stenosis.  0% residual disease after stenting.  LVEDP: 16 mm Hg  No significant transvalvular aortic gradient by pullback method  Assessment:  Successful shockwave lithotripsy, PTCA and PCI utilizing a 4x15 mm [place] stent with 0% residual disease.  Plan:  -Continue dual anti-platelet therapy for minimum of 6-12 months.  Lifelong aspirin.  -Optimization of CAD with guideline directed medical therapy  -Follow-up with Dr. Gerber in 1 week from the procedure     ECHO 10.4.22:  The study quality is average.   The left ventricle is normal in size.  Global left ventricular systolic function is normal. The left ventricular ejection fraction is 55%. Left ventricular diastolic function is normal.   Mild to moderate (1-2+) tricuspid regurgitation.  Mild (1+) mitral regurgitation  The pulmonary artery systolic pressure is 35 mmHg.   An intracardiac device wire is visualized in the right heart.      PET 9.22.22:  This is a normal perfusion study, no perfusion defects noted. There is no evidence of ischemia.   This scan is suggestive of low risk for future cardiovascular events.   The left ventricular cavity is noted to be normal on the stress studies. The stress left ventricular ejection fraction was calculated to be 74% and left ventricular global function is normal. The rest left  ventricular cavity is noted to be normal. The rest left ventricular ejection fraction was calculated to be 75% and rest left ventricular global function is normal.   When compared to the resting ejection fraction (75%), the stress ejection fraction (74%) has decreased.   The study quality is average.   There was a rise in myocardial blood flow between rest and stress.  Global myocardial blood flow reserve was 1.55.  Myocardial blood flow reserve is globally abnormal, placing the patient at a higher coronary event risk.     CAC Score 12.1.20:  Total 15    * No surgery found *     Consults:       There are no hospital problems to display for this patient.      Discharged Condition: good    Review of Systems   Cardiovascular:  Negative for chest pain, dyspnea on exertion, leg swelling and palpitations.   Respiratory:  Negative for shortness of breath.    All other systems reviewed and are negative.      Physical Exam  Vitals and nursing note reviewed.   HENT:      Head: Normocephalic.   Eyes:      Pupils: Pupils are equal, round, and reactive to light.   Cardiovascular:      Rate and Rhythm: Normal rate and regular rhythm.      Pulses: Normal pulses.      Heart sounds: Normal heart sounds.   Pulmonary:      Effort: Pulmonary effort is normal.      Breath sounds: Normal breath sounds.   Abdominal:      General: Bowel sounds are normal.      Palpations: Abdomen is soft.   Musculoskeletal:         General: Normal range of motion.      Cervical back: Normal range of motion.   Skin:     General: Skin is warm.   Neurological:      Mental Status: She is alert and oriented to person, place, and time.   Psychiatric:         Mood and Affect: Mood normal.         Behavior: Behavior normal.         Disposition: Home    Follow Up: Follow-up with Dr. Gerber in 1-2 weeks     Patient Instructions:   No discharge procedures on file.  Medications:  Reconciled Home Medications:      Medication List        CHANGE how you take these  medications      rosuvastatin 10 MG tablet  Commonly known as: CRESTOR  Take 1 tablet (10 mg total) by mouth nightly.  What changed: when to take this            CONTINUE taking these medications      aspirin 81 MG EC tablet  Commonly known as: ECOTRIN  Take 81 mg by mouth nightly.     clopidogreL 75 mg tablet  Commonly known as: PLAVIX  Take 1 tablet (75 mg total) by mouth once daily.     cyclobenzaprine 5 MG tablet  Commonly known as: FLEXERIL  Take 5 mg by mouth daily as needed.     ezetimibe 10 mg tablet  Commonly known as: ZETIA  Take 10 mg by mouth once daily.     furosemide 20 MG tablet  Commonly known as: LASIX  Take 20 mg by mouth once daily.     gabapentin 300 MG capsule  Commonly known as: NEURONTIN  TAKE 1 CAPSULE(300 MG) BY MOUTH THREE TIMES DAILY     lisinopriL 20 MG tablet  Commonly known as: PRINIVIL,ZESTRIL  Take 1 tablet (20 mg total) by mouth once daily.     meloxicam 15 MG tablet  Commonly known as: MOBIC  TAKE 1 TABLET BY MOUTH EVERY DAY AS NEEDED FOR PAIN WITH FOOD     metoprolol succinate 25 MG 24 hr tablet  Commonly known as: TOPROL-XL  Take 25 mg by mouth once daily.     nitroGLYCERIN 0.4 MG SL tablet  Commonly known as: NITROSTAT  Place under the tongue.     omeprazole 20 MG tablet  Commonly known as: PRILOSEC OTC  Take 1 tablet (20 mg total) by mouth every morning.     pantoprazole 40 MG tablet  Commonly known as: PROTONIX  Take 40 mg by mouth once daily.     paroxetine 30 MG tablet  Commonly known as: PAXIL  Take 1 tablet (30 mg total) by mouth every morning.     spironolactone 25 MG tablet  Commonly known as: ALDACTONE  Take 1 tablet (25 mg total) by mouth once daily.     traZODone 50 MG tablet  Commonly known as: DESYREL  Take 1 tablet (50 mg total) by mouth nightly.              Impression  Chronic Diastolic HF/EF 55% - Euvolemic on Exam  NSTEMI - Type II in the Setting of HF    - Peaked Troponin 0.055  Rhinovirus  CAD/Stents  HTN  HLD  Depression  DENNIS/CPAP  Breast  Cancer  Anxiety  GERD  No Hx of GIB     Plan:   Discharge Home   ECHO Reviewed patient is hyperdynamic with an EF of 70-75%  Increase oral fluids at Home   Continue Home Meds: ASA, Statin, Plavix, BB  Follow-up with Dr. Gerber in 1-2 weeks         Time spent on the discharge of patient: 36 minutes    CLAUDIO Harvey  Cardiology  [facility] - [facility] Tele[facility]

## 2024-02-13 NOTE — NURSING
Pt educated on fluid overload and CHF. Pt will follow up with cardiology and PCP. Pt verbalizes understanding and has no further complaints. VSS. IV and tele removed.

## 2024-02-16 ENCOUNTER — PATIENT OUTREACH (OUTPATIENT)
Dept: ADMINISTRATIVE | Facility: CLINIC | Age: 64
End: 2024-02-16
Payer: COMMERCIAL

## 2024-02-16 LAB
BACTERIA BLD CULT: NORMAL
BACTERIA BLD CULT: NORMAL

## 2024-02-16 NOTE — PROGRESS NOTES
C3 nurse attempted to contact Donna Graham  for a TCC post hospital discharge follow up call. No answer. The patient does not have a scheduled HOSFU appointment, and the pt does not have [facility].

## 2024-02-19 ENCOUNTER — PATIENT MESSAGE (OUTPATIENT)
Dept: ADMINISTRATIVE | Facility: CLINIC | Age: 64
End: 2024-02-19
Payer: COMMERCIAL

## 2024-02-19 NOTE — PROGRESS NOTES
C3 nurse attempted to contact Donna Graham  for a TCC post hospital discharge follow up call. No answer. The patient does not have a scheduled HOSFU appointment, and the pt does not have an [facility] PCP.

## 2024-02-19 NOTE — PROGRESS NOTES
C3 nurse attempted to contact Donna Graham for a TCC post hospital discharge follow up call. Pt daughter stated she'll inform pt to contact me.

## 2024-02-19 NOTE — PROGRESS NOTES
C3 nurse spoke with Donna Graham  for a TCC post hospital discharge follow up call. The patient does not have a scheduled HOSFU appointment with Catie Jiang NP  within 5-7 days post hospital discharge date 2/13/24. C3 nurse was unable to schedule HOSFU appointment in [facility]. Pt has [facility] pcp. Informed pt to contact pcp for f/u appt within 5-7 days after discharge date. Pt verbalized understanding.

## 2024-05-15 ENCOUNTER — LAB VISIT (OUTPATIENT)
Dept: LAB | Facility: HOSPITAL | Age: 64
End: 2024-05-15
Payer: COMMERCIAL

## 2024-05-15 DIAGNOSIS — Z00.00 ROUTINE GENERAL MEDICAL EXAMINATION AT A HEALTH CARE FACILITY: ICD-10-CM

## 2024-05-15 DIAGNOSIS — Z13.1 SCREENING FOR DIABETES MELLITUS: Primary | ICD-10-CM

## 2024-05-15 LAB
25(OH)D3+25(OH)D2 SERPL-MCNC: 35 NG/ML (ref 30–80)
ALBUMIN SERPL-MCNC: 4.5 G/DL (ref 3.4–4.8)
ALBUMIN/GLOB SERPL: 1.3 RATIO (ref 1.1–2)
ALP SERPL-CCNC: 98 UNIT/L (ref 40–150)
ALT SERPL-CCNC: 33 UNIT/L (ref 0–55)
AST SERPL-CCNC: 33 UNIT/L (ref 5–34)
BASOPHILS # BLD AUTO: 0.01 X10(3)/MCL
BASOPHILS NFR BLD AUTO: 0.2 %
BILIRUB SERPL-MCNC: 0.6 MG/DL
BUN SERPL-MCNC: 19 MG/DL (ref 9.8–20.1)
CALCIUM SERPL-MCNC: 9.9 MG/DL (ref 8.4–10.2)
CHLORIDE SERPL-SCNC: 104 MMOL/L (ref 98–107)
CHOLEST SERPL-MCNC: 132 MG/DL
CHOLEST/HDLC SERPL: 3 {RATIO} (ref 0–5)
CO2 SERPL-SCNC: 29 MMOL/L (ref 23–31)
CREAT SERPL-MCNC: 1.02 MG/DL (ref 0.55–1.02)
EOSINOPHIL # BLD AUTO: 0.11 X10(3)/MCL (ref 0–0.9)
EOSINOPHIL NFR BLD AUTO: 2.4 %
ERYTHROCYTE [DISTWIDTH] IN BLOOD BY AUTOMATED COUNT: 13.6 % (ref 11.5–17)
EST. AVERAGE GLUCOSE BLD GHB EST-MCNC: 114 MG/DL
GFR SERPLBLD CREATININE-BSD FMLA CKD-EPI: >60 ML/MIN/1.73/M2
GLOBULIN SER-MCNC: 3.6 GM/DL (ref 2.4–3.5)
GLUCOSE SERPL-MCNC: 104 MG/DL (ref 82–115)
HBA1C MFR BLD: 5.6 %
HCT VFR BLD AUTO: 41.4 % (ref 37–47)
HDLC SERPL-MCNC: 50 MG/DL (ref 35–60)
HGB BLD-MCNC: 13.1 G/DL (ref 12–16)
IMM GRANULOCYTES # BLD AUTO: 0.01 X10(3)/MCL (ref 0–0.04)
IMM GRANULOCYTES NFR BLD AUTO: 0.2 %
LDLC SERPL CALC-MCNC: 65 MG/DL (ref 50–140)
LYMPHOCYTES # BLD AUTO: 1.16 X10(3)/MCL (ref 0.6–4.6)
LYMPHOCYTES NFR BLD AUTO: 25.2 %
MCH RBC QN AUTO: 29.1 PG (ref 27–31)
MCHC RBC AUTO-ENTMCNC: 31.6 G/DL (ref 33–36)
MCV RBC AUTO: 92 FL (ref 80–94)
MONOCYTES # BLD AUTO: 0.33 X10(3)/MCL (ref 0.1–1.3)
MONOCYTES NFR BLD AUTO: 7.2 %
NEUTROPHILS # BLD AUTO: 2.99 X10(3)/MCL (ref 2.1–9.2)
NEUTROPHILS NFR BLD AUTO: 64.8 %
NRBC BLD AUTO-RTO: 0 %
PLATELET # BLD AUTO: 158 X10(3)/MCL (ref 130–400)
PMV BLD AUTO: 10.1 FL (ref 7.4–10.4)
POTASSIUM SERPL-SCNC: 4.2 MMOL/L (ref 3.5–5.1)
PROT SERPL-MCNC: 8.1 GM/DL (ref 5.8–7.6)
RBC # BLD AUTO: 4.5 X10(6)/MCL (ref 4.2–5.4)
SODIUM SERPL-SCNC: 141 MMOL/L (ref 136–145)
TRIGL SERPL-MCNC: 86 MG/DL (ref 37–140)
TSH SERPL-ACNC: 0.7 UIU/ML (ref 0.35–4.94)
VLDLC SERPL CALC-MCNC: 17 MG/DL
WBC # SPEC AUTO: 4.61 X10(3)/MCL (ref 4.5–11.5)

## 2024-05-15 PROCEDURE — 82306 VITAMIN D 25 HYDROXY: CPT

## 2024-05-15 PROCEDURE — 85025 COMPLETE CBC W/AUTO DIFF WBC: CPT

## 2024-05-15 PROCEDURE — 36415 COLL VENOUS BLD VENIPUNCTURE: CPT

## 2024-05-15 PROCEDURE — 84443 ASSAY THYROID STIM HORMONE: CPT

## 2024-05-15 PROCEDURE — 83036 HEMOGLOBIN GLYCOSYLATED A1C: CPT

## 2024-05-15 PROCEDURE — 80053 COMPREHEN METABOLIC PANEL: CPT

## 2024-05-15 PROCEDURE — 80061 LIPID PANEL: CPT

## 2024-09-11 ENCOUNTER — HOSPITAL ENCOUNTER (OUTPATIENT)
Facility: HOSPITAL | Age: 64
Discharge: HOME | End: 2024-09-12
Attending: STUDENT IN AN ORGANIZED HEALTH CARE EDUCATION/TRAINING PROGRAM | Admitting: INTERNAL MEDICINE
Payer: COMMERCIAL

## 2024-09-11 DIAGNOSIS — R06.02 SOB (SHORTNESS OF BREATH): ICD-10-CM

## 2024-09-11 DIAGNOSIS — R06.02 SHORTNESS OF BREATH: ICD-10-CM

## 2024-09-11 DIAGNOSIS — R09.02 HYPOXIA: ICD-10-CM

## 2024-09-11 DIAGNOSIS — R73.9 HYPERGLYCEMIA: ICD-10-CM

## 2024-09-11 DIAGNOSIS — R07.89 CHEST WALL PAIN: ICD-10-CM

## 2024-09-11 DIAGNOSIS — R79.89 ELEVATED BRAIN NATRIURETIC PEPTIDE (BNP) LEVEL: ICD-10-CM

## 2024-09-11 DIAGNOSIS — D64.9 NORMOCYTIC ANEMIA: ICD-10-CM

## 2024-09-11 DIAGNOSIS — R79.89 ELEVATED TROPONIN: ICD-10-CM

## 2024-09-11 DIAGNOSIS — J14: Primary | ICD-10-CM

## 2024-09-11 DIAGNOSIS — R74.01 TRANSAMINITIS: ICD-10-CM

## 2024-09-11 LAB
ALBUMIN SERPL-MCNC: 3.6 G/DL (ref 3.4–4.8)
ALBUMIN/GLOB SERPL: 0.8 RATIO (ref 1.1–2)
ALP SERPL-CCNC: 113 UNIT/L (ref 40–150)
ALT SERPL-CCNC: 58 UNIT/L (ref 0–55)
ANION GAP SERPL CALC-SCNC: 14 MEQ/L
AST SERPL-CCNC: 59 UNIT/L (ref 5–34)
BASOPHILS # BLD AUTO: 0.01 X10(3)/MCL
BASOPHILS NFR BLD AUTO: 0.1 %
BILIRUB SERPL-MCNC: 1.4 MG/DL
BNP BLD-MCNC: 131.8 PG/ML
BUN SERPL-MCNC: 21 MG/DL (ref 9.8–20.1)
CALCIUM SERPL-MCNC: 9.4 MG/DL (ref 8.4–10.2)
CHLORIDE SERPL-SCNC: 98 MMOL/L (ref 98–107)
CO2 SERPL-SCNC: 20 MMOL/L (ref 23–31)
CREAT SERPL-MCNC: 1.13 MG/DL (ref 0.55–1.02)
CREAT/UREA NIT SERPL: 19
EOSINOPHIL # BLD AUTO: 0 X10(3)/MCL (ref 0–0.9)
EOSINOPHIL NFR BLD AUTO: 0 %
ERYTHROCYTE [DISTWIDTH] IN BLOOD BY AUTOMATED COUNT: 15.3 % (ref 11.5–17)
GFR SERPLBLD CREATININE-BSD FMLA CKD-EPI: 54 ML/MIN/1.73/M2
GLOBULIN SER-MCNC: 4.5 GM/DL (ref 2.4–3.5)
GLUCOSE SERPL-MCNC: 182 MG/DL (ref 82–115)
HCT VFR BLD AUTO: 34.2 % (ref 37–47)
HGB BLD-MCNC: 11.1 G/DL (ref 12–16)
IMM GRANULOCYTES # BLD AUTO: 0.04 X10(3)/MCL (ref 0–0.04)
IMM GRANULOCYTES NFR BLD AUTO: 0.4 %
LIPASE SERPL-CCNC: 21 U/L
LYMPHOCYTES # BLD AUTO: 0.58 X10(3)/MCL (ref 0.6–4.6)
LYMPHOCYTES NFR BLD AUTO: 5.8 %
MAGNESIUM SERPL-MCNC: 2.3 MG/DL (ref 1.6–2.6)
MCH RBC QN AUTO: 29.6 PG (ref 27–31)
MCHC RBC AUTO-ENTMCNC: 32.5 G/DL (ref 33–36)
MCV RBC AUTO: 91.2 FL (ref 80–94)
MONOCYTES # BLD AUTO: 0.7 X10(3)/MCL (ref 0.1–1.3)
MONOCYTES NFR BLD AUTO: 7 %
NEUTROPHILS # BLD AUTO: 8.64 X10(3)/MCL (ref 2.1–9.2)
NEUTROPHILS NFR BLD AUTO: 86.7 %
NRBC BLD AUTO-RTO: 0 %
OHS QRS DURATION: 148 MS
OHS QTC CALCULATION: 484 MS
PLATELET # BLD AUTO: 139 X10(3)/MCL (ref 130–400)
PMV BLD AUTO: 11.4 FL (ref 7.4–10.4)
POTASSIUM SERPL-SCNC: 4.6 MMOL/L (ref 3.5–5.1)
PROT SERPL-MCNC: 8.1 GM/DL (ref 5.8–7.6)
RBC # BLD AUTO: 3.75 X10(6)/MCL (ref 4.2–5.4)
SODIUM SERPL-SCNC: 132 MMOL/L (ref 136–145)
TROPONIN I SERPL-MCNC: 0.03 NG/ML (ref 0–0.04)
TROPONIN I SERPL-MCNC: 0.06 NG/ML (ref 0–0.04)
TROPONIN I SERPL-MCNC: 0.06 NG/ML (ref 0–0.04)
WBC # BLD AUTO: 9.97 X10(3)/MCL (ref 4.5–11.5)

## 2024-09-11 PROCEDURE — G0378 HOSPITAL OBSERVATION PER HR: HCPCS

## 2024-09-11 PROCEDURE — 96361 HYDRATE IV INFUSION ADD-ON: CPT

## 2024-09-11 PROCEDURE — 25000242 PHARM REV CODE 250 ALT 637 W/ HCPCS: Performed by: INTERNAL MEDICINE

## 2024-09-11 PROCEDURE — 86140 C-REACTIVE PROTEIN: CPT | Performed by: STUDENT IN AN ORGANIZED HEALTH CARE EDUCATION/TRAINING PROGRAM

## 2024-09-11 PROCEDURE — 84145 PROCALCITONIN (PCT): CPT | Performed by: STUDENT IN AN ORGANIZED HEALTH CARE EDUCATION/TRAINING PROGRAM

## 2024-09-11 PROCEDURE — 83690 ASSAY OF LIPASE: CPT | Performed by: STUDENT IN AN ORGANIZED HEALTH CARE EDUCATION/TRAINING PROGRAM

## 2024-09-11 PROCEDURE — 84484 ASSAY OF TROPONIN QUANT: CPT | Performed by: STUDENT IN AN ORGANIZED HEALTH CARE EDUCATION/TRAINING PROGRAM

## 2024-09-11 PROCEDURE — 93005 ELECTROCARDIOGRAM TRACING: CPT

## 2024-09-11 PROCEDURE — 94640 AIRWAY INHALATION TREATMENT: CPT

## 2024-09-11 PROCEDURE — 36415 COLL VENOUS BLD VENIPUNCTURE: CPT | Performed by: INTERNAL MEDICINE

## 2024-09-11 PROCEDURE — 25000003 PHARM REV CODE 250: Performed by: STUDENT IN AN ORGANIZED HEALTH CARE EDUCATION/TRAINING PROGRAM

## 2024-09-11 PROCEDURE — 85025 COMPLETE CBC W/AUTO DIFF WBC: CPT | Performed by: STUDENT IN AN ORGANIZED HEALTH CARE EDUCATION/TRAINING PROGRAM

## 2024-09-11 PROCEDURE — 63600175 PHARM REV CODE 636 W HCPCS: Performed by: STUDENT IN AN ORGANIZED HEALTH CARE EDUCATION/TRAINING PROGRAM

## 2024-09-11 PROCEDURE — 96365 THER/PROPH/DIAG IV INF INIT: CPT

## 2024-09-11 PROCEDURE — 83735 ASSAY OF MAGNESIUM: CPT | Performed by: STUDENT IN AN ORGANIZED HEALTH CARE EDUCATION/TRAINING PROGRAM

## 2024-09-11 PROCEDURE — 94640 AIRWAY INHALATION TREATMENT: CPT | Mod: XB

## 2024-09-11 PROCEDURE — 27000221 HC OXYGEN, UP TO 24 HOURS

## 2024-09-11 PROCEDURE — 94761 N-INVAS EAR/PLS OXIMETRY MLT: CPT

## 2024-09-11 PROCEDURE — 25000242 PHARM REV CODE 250 ALT 637 W/ HCPCS: Performed by: STUDENT IN AN ORGANIZED HEALTH CARE EDUCATION/TRAINING PROGRAM

## 2024-09-11 PROCEDURE — 99285 EMERGENCY DEPT VISIT HI MDM: CPT | Mod: 25

## 2024-09-11 PROCEDURE — 99900031 HC PATIENT EDUCATION (STAT)

## 2024-09-11 PROCEDURE — 84484 ASSAY OF TROPONIN QUANT: CPT | Mod: 91 | Performed by: INTERNAL MEDICINE

## 2024-09-11 PROCEDURE — 83880 ASSAY OF NATRIURETIC PEPTIDE: CPT | Performed by: STUDENT IN AN ORGANIZED HEALTH CARE EDUCATION/TRAINING PROGRAM

## 2024-09-11 PROCEDURE — 96375 TX/PRO/DX INJ NEW DRUG ADDON: CPT

## 2024-09-11 PROCEDURE — 96376 TX/PRO/DX INJ SAME DRUG ADON: CPT

## 2024-09-11 PROCEDURE — 80053 COMPREHEN METABOLIC PANEL: CPT | Performed by: STUDENT IN AN ORGANIZED HEALTH CARE EDUCATION/TRAINING PROGRAM

## 2024-09-11 PROCEDURE — 25000003 PHARM REV CODE 250: Performed by: INTERNAL MEDICINE

## 2024-09-11 RX ORDER — LOPERAMIDE HYDROCHLORIDE 2 MG/1
4 CAPSULE ORAL ONCE
Status: DISCONTINUED | OUTPATIENT
Start: 2024-09-11 | End: 2024-09-11

## 2024-09-11 RX ORDER — METOPROLOL SUCCINATE 25 MG/1
25 TABLET, EXTENDED RELEASE ORAL DAILY
Status: DISCONTINUED | OUTPATIENT
Start: 2024-09-12 | End: 2024-09-12 | Stop reason: HOSPADM

## 2024-09-11 RX ORDER — PANTOPRAZOLE SODIUM 40 MG/1
40 TABLET, DELAYED RELEASE ORAL DAILY
Status: DISCONTINUED | OUTPATIENT
Start: 2024-09-12 | End: 2024-09-12 | Stop reason: HOSPADM

## 2024-09-11 RX ORDER — IPRATROPIUM BROMIDE AND ALBUTEROL SULFATE 2.5; .5 MG/3ML; MG/3ML
3 SOLUTION RESPIRATORY (INHALATION) EVERY 6 HOURS PRN
Status: DISCONTINUED | OUTPATIENT
Start: 2024-09-11 | End: 2024-09-12 | Stop reason: HOSPADM

## 2024-09-11 RX ORDER — ONDANSETRON HYDROCHLORIDE 2 MG/ML
4 INJECTION, SOLUTION INTRAVENOUS EVERY 8 HOURS PRN
Status: DISCONTINUED | OUTPATIENT
Start: 2024-09-11 | End: 2024-09-12 | Stop reason: HOSPADM

## 2024-09-11 RX ORDER — TALC
6 POWDER (GRAM) TOPICAL NIGHTLY PRN
Status: DISCONTINUED | OUTPATIENT
Start: 2024-09-11 | End: 2024-09-12 | Stop reason: HOSPADM

## 2024-09-11 RX ORDER — METHYLPREDNISOLONE SOD SUCC 125 MG
80 VIAL (EA) INJECTION
Status: COMPLETED | OUTPATIENT
Start: 2024-09-11 | End: 2024-09-11

## 2024-09-11 RX ORDER — PROCHLORPERAZINE EDISYLATE 5 MG/ML
5 INJECTION INTRAMUSCULAR; INTRAVENOUS EVERY 6 HOURS PRN
Status: DISCONTINUED | OUTPATIENT
Start: 2024-09-11 | End: 2024-09-12 | Stop reason: HOSPADM

## 2024-09-11 RX ORDER — ACETAMINOPHEN 325 MG/1
650 TABLET ORAL EVERY 8 HOURS PRN
Status: DISCONTINUED | OUTPATIENT
Start: 2024-09-11 | End: 2024-09-11

## 2024-09-11 RX ORDER — ACETAMINOPHEN 325 MG/1
650 TABLET ORAL EVERY 8 HOURS PRN
Status: DISCONTINUED | OUTPATIENT
Start: 2024-09-11 | End: 2024-09-12 | Stop reason: HOSPADM

## 2024-09-11 RX ORDER — EZETIMIBE 10 MG/1
10 TABLET ORAL DAILY
Status: DISCONTINUED | OUTPATIENT
Start: 2024-09-12 | End: 2024-09-12 | Stop reason: HOSPADM

## 2024-09-11 RX ORDER — ASPIRIN 325 MG
325 TABLET ORAL
Status: COMPLETED | OUTPATIENT
Start: 2024-09-11 | End: 2024-09-11

## 2024-09-11 RX ORDER — MORPHINE SULFATE 2 MG/ML
4 INJECTION, SOLUTION INTRAMUSCULAR; INTRAVENOUS EVERY 4 HOURS PRN
Status: DISCONTINUED | OUTPATIENT
Start: 2024-09-11 | End: 2024-09-11

## 2024-09-11 RX ORDER — IPRATROPIUM BROMIDE AND ALBUTEROL SULFATE 2.5; .5 MG/3ML; MG/3ML
3 SOLUTION RESPIRATORY (INHALATION)
Status: COMPLETED | OUTPATIENT
Start: 2024-09-11 | End: 2024-09-11

## 2024-09-11 RX ORDER — GABAPENTIN 300 MG/1
600 CAPSULE ORAL NIGHTLY
Status: DISCONTINUED | OUTPATIENT
Start: 2024-09-11 | End: 2024-09-12 | Stop reason: HOSPADM

## 2024-09-11 RX ORDER — SODIUM CHLORIDE 0.9 % (FLUSH) 0.9 %
10 SYRINGE (ML) INJECTION
Status: DISCONTINUED | OUTPATIENT
Start: 2024-09-11 | End: 2024-09-12 | Stop reason: HOSPADM

## 2024-09-11 RX ORDER — MORPHINE SULFATE 2 MG/ML
2 INJECTION, SOLUTION INTRAMUSCULAR; INTRAVENOUS EVERY 4 HOURS PRN
Status: DISCONTINUED | OUTPATIENT
Start: 2024-09-11 | End: 2024-09-11

## 2024-09-11 RX ORDER — ASPIRIN 81 MG/1
81 TABLET ORAL NIGHTLY
Status: DISCONTINUED | OUTPATIENT
Start: 2024-09-12 | End: 2024-09-12 | Stop reason: HOSPADM

## 2024-09-11 RX ORDER — ONDANSETRON HYDROCHLORIDE 2 MG/ML
4 INJECTION, SOLUTION INTRAVENOUS
Status: COMPLETED | OUTPATIENT
Start: 2024-09-11 | End: 2024-09-11

## 2024-09-11 RX ORDER — CYCLOBENZAPRINE HCL 5 MG
5 TABLET ORAL DAILY PRN
Status: DISCONTINUED | OUTPATIENT
Start: 2024-09-11 | End: 2024-09-12 | Stop reason: HOSPADM

## 2024-09-11 RX ORDER — ATORVASTATIN CALCIUM 10 MG/1
20 TABLET, FILM COATED ORAL DAILY
Status: DISCONTINUED | OUTPATIENT
Start: 2024-09-12 | End: 2024-09-12 | Stop reason: HOSPADM

## 2024-09-11 RX ORDER — BENZONATATE 100 MG/1
100 CAPSULE ORAL 3 TIMES DAILY PRN
Status: DISCONTINUED | OUTPATIENT
Start: 2024-09-11 | End: 2024-09-12 | Stop reason: HOSPADM

## 2024-09-11 RX ORDER — ALUMINUM HYDROXIDE, MAGNESIUM HYDROXIDE, AND SIMETHICONE 1200; 120; 1200 MG/30ML; MG/30ML; MG/30ML
30 SUSPENSION ORAL EVERY 6 HOURS PRN
Status: DISCONTINUED | OUTPATIENT
Start: 2024-09-11 | End: 2024-09-12 | Stop reason: HOSPADM

## 2024-09-11 RX ORDER — LISINOPRIL 20 MG/1
20 TABLET ORAL DAILY
Status: DISCONTINUED | OUTPATIENT
Start: 2024-09-12 | End: 2024-09-12 | Stop reason: HOSPADM

## 2024-09-11 RX ADMIN — GABAPENTIN 600 MG: 300 CAPSULE ORAL at 08:09

## 2024-09-11 RX ADMIN — METHYLPREDNISOLONE SODIUM SUCCINATE 80 MG: 125 INJECTION, POWDER, FOR SOLUTION INTRAMUSCULAR; INTRAVENOUS at 12:09

## 2024-09-11 RX ADMIN — ONDANSETRON 4 MG: 2 INJECTION INTRAMUSCULAR; INTRAVENOUS at 11:09

## 2024-09-11 RX ADMIN — SODIUM CHLORIDE 500 ML: 9 INJECTION, SOLUTION INTRAVENOUS at 11:09

## 2024-09-11 RX ADMIN — ASPIRIN 325 MG ORAL TABLET 325 MG: 325 PILL ORAL at 11:09

## 2024-09-11 RX ADMIN — IPRATROPIUM BROMIDE AND ALBUTEROL SULFATE 3 ML: 2.5; .5 SOLUTION RESPIRATORY (INHALATION) at 11:09

## 2024-09-11 RX ADMIN — AZITHROMYCIN MONOHYDRATE 500 MG: 500 INJECTION, POWDER, LYOPHILIZED, FOR SOLUTION INTRAVENOUS at 01:09

## 2024-09-11 RX ADMIN — Medication 6 MG: at 08:09

## 2024-09-11 RX ADMIN — IPRATROPIUM BROMIDE AND ALBUTEROL SULFATE 3 ML: 2.5; .5 SOLUTION RESPIRATORY (INHALATION) at 06:09

## 2024-09-11 RX ADMIN — CYCLOBENZAPRINE HYDROCHLORIDE 5 MG: 5 TABLET, FILM COATED ORAL at 08:09

## 2024-09-11 RX ADMIN — CEFTRIAXONE SODIUM 1 G: 1 INJECTION, POWDER, FOR SOLUTION INTRAMUSCULAR; INTRAVENOUS at 12:09

## 2024-09-11 NOTE — ASSESSMENT & PLAN NOTE
Echo    Interpretation Summary    Technically difficult study, No Definity contrast is used in this study.    Left Ventricle: The left ventricle is normal in size. There is mild concentric hypertrophy. Normal wall motion. There is hyperdynamic systolic function with a visually estimated ejection fraction of 70 - 75%. Grade I diastolic dysfunction.    Right Ventricle: Normal right ventricular cavity size. Systolic function is normal.    Mitral Valve: There is no stenosis. The mean pressure gradient across the mitral valve is 2 mmHg at a heart rate of  bpm.    Pulmonic Valve: There is no stenosis.    Increased velocity across aortic valve and LVOT which is probably related to hyperdynamic LV function. Cine images suggests no hemodynamically significant aortic stenosis.    No other significant valvular abnormalities noted in this study.  Last BNP reviewed- and noted below   Recent Labs   Lab 09/11/24  1103   .8*

## 2024-09-11 NOTE — ASSESSMENT & PLAN NOTE
Chronic, controlled. Latest blood pressure and vitals reviewed-     Temp:  [97.5 °F (36.4 °C)]   Pulse:  [60-82]   Resp:  [20-25]   BP: (117-143)/(44-56)   SpO2:  [89 %-93 %] .   Home meds for hypertension were reviewed and noted below.   Hypertension Medications               furosemide (LASIX) 20 MG tablet Take 20 mg by mouth once daily.    lisinopriL (PRINIVIL,ZESTRIL) 20 MG tablet Take 1 tablet (20 mg total) by mouth once daily.    metoprolol succinate (TOPROL-XL) 25 MG 24 hr tablet Take 25 mg by mouth once daily.    nitroGLYCERIN (NITROSTAT) 0.4 MG SL tablet Place under the tongue.    spironolactone (ALDACTONE) 25 MG tablet Take 1 tablet (25 mg total) by mouth once daily.            While in the hospital, will manage blood pressure as follows; Continue home antihypertensive regimen    Will utilize p.r.n. blood pressure medication only if patient's blood pressure greater than 180/110 and she develops symptoms such as worsening chest pain or shortness of breath.

## 2024-09-11 NOTE — H&P
[facility] - Medical Surgical Unit  [facility] Medicine  History & Physical    Patient Name: Donna Graham  MRN: [id number]  Patient Class: OP- Observation  Admission Date: 9/11/2024  Attending Physician: Farnaz Mckay DO   Primary Care Provider: Catie Jiang NP         Patient information was obtained from patient and ER records.     Subjective:     Principal Problem:Hypoxia    Chief Complaint:   Chief Complaint   Patient presents with    Shortness of Breath     SOB and weakness worsening today.          HPI: 64 yo CF with PMH of CAD, CHF, permanent pacemaker, (Follows with Dr. Gerber), HTN, DENNIS on CPAP, GERD, NAFLD, anxiety, and depression presented to the ED with 5 days of SOB, generalized weakness, fatigue, and loss of appetite.  She also complained of CP. She presented to an urgent care at [facility] on Monday and was given augmentin. She then developed N/V/D that she attributes to the Augmentin. She presented to the ER due to persistent and worsening symptoms. She lives alone. In the ER, vitals were significant for SpO2 as low as 89% and RR up to 38 bpm. Labs showed toponin 0.06, .8, Cr 1.13, Na 132, wbc nml at 9.97, and CXR with right perihilar consolidation concerning for pna. Respiratory panel collected Monday at urgent care revealed Haemophilus influenzae, pansensitive, including augmentin and ceftriaxone. Pt was started on ceftriaxone and azithromycin, given 500 cc NS, and 2 LPM supplemental O2 via NC. CIS was consulted for CP and elevated troponin and recommended trending troponins, likely type 2 MI due to hypoxia. Hospitalist was consulted for admission for observation.   Upon my examination, pt appears anxious. She is lying in bed, on room air, with SpO2 94% and above. Audible wheezing heard from the bedside while she is speaking, in between words. Likely from her throat, as her lungs are clear on auscultation. Wheezing resolves when pt calms. She states that she has  been using her CPAP at home during the day due to her SOB. She reports that she had COVID 1 month ago, and has repeatedly gotten sick since then. She states she is feeling somewhat better than when she arrived, upon my examination.     Past Medical History:   Diagnosis Date    Cancer     Coronary artery disease     Depression     Hyperlipidemia     Hypertension     Sleep apnea        Past Surgical History:   Procedure Laterality Date    ATHERECTOMY  10/20/2022    Procedure: Atherectomy;  Surgeon: Aron Pereyra MD;  Location: [facility] CATH LAB;  Service: Cardiology;;    INSERT / REPLACE / REMOVE PACEMAKER      08/2023    LEFT HEART CATHETERIZATION Left 10/20/2022    Procedure: CATHETERIZATION, HEART, LEFT;  Surgeon: Aron Pereyra MD;  Location: [facility] CATH LAB;  Service: Cardiology;  Laterality: Left;  LHC +/- PCI    MASTECTOMY      REPLACEMENT OF DUAL CHAMBER PACEMAKER GENERATOR N/A 07/26/2023    Procedure: REPLACEMENT, PULSE GENERATOR OF DUAL CHAMBER PACEMAKER;  Surgeon: Feliz Yo MD;  Location: [facility] CATH LAB;  Service: Cardiology;  Laterality: N/A;  DUAL CHAMBER PPM GEN CHANGE  (BS)    TONSILLECTOMY         Review of patient's allergies indicates:   Allergen Reactions    Codeine Itching and Nausea And Vomiting     and causing itching.   and causing itching.   and causing itching.       Sulfa (sulfonamide antibiotics) Itching and Nausea And Vomiting       Current Facility-Administered Medications on File Prior to Encounter   Medication    cefazolin (ANCEF) 2 gram in dextrose 5% 50 mL IVPB (premix)    sodium chloride 0.9% flush 10 mL     Current Outpatient Medications on File Prior to Encounter   Medication Sig    aspirin (ECOTRIN) 81 MG EC tablet Take 81 mg by mouth nightly.    clopidogreL (PLAVIX) 75 mg tablet Take 1 tablet (75 mg total) by mouth once daily.    cyclobenzaprine (FLEXERIL) 5 MG tablet Take 5 mg by mouth daily as needed.    ezetimibe (ZETIA) 10 mg tablet Take 10 mg by mouth once daily.     furosemide (LASIX) 20 MG tablet Take 20 mg by mouth once daily.    gabapentin (NEURONTIN) 300 MG capsule TAKE 1 CAPSULE(300 MG) BY MOUTH THREE TIMES DAILY    lisinopriL (PRINIVIL,ZESTRIL) 20 MG tablet Take 1 tablet (20 mg total) by mouth once daily.    meloxicam (MOBIC) 15 MG tablet TAKE 1 TABLET BY MOUTH EVERY DAY AS NEEDED FOR PAIN WITH FOOD (Patient not taking: Reported on 2/19/2024)    metoprolol succinate (TOPROL-XL) 25 MG 24 hr tablet Take 25 mg by mouth once daily.    nitroGLYCERIN (NITROSTAT) 0.4 MG SL tablet Place under the tongue.    omeprazole (PRILOSEC OTC) 20 MG tablet Take 1 tablet (20 mg total) by mouth every morning.    pantoprazole (PROTONIX) 40 MG tablet Take 40 mg by mouth once daily.    paroxetine (PAXIL) 30 MG tablet Take 1 tablet (30 mg total) by mouth every morning.    rosuvastatin (CRESTOR) 10 MG tablet Take 1 tablet (10 mg total) by mouth nightly. (Patient taking differently: Take 10 mg by mouth every morning.)    spironolactone (ALDACTONE) 25 MG tablet Take 1 tablet (25 mg total) by mouth once daily. (Patient taking differently: Take 12.5 mg by mouth once daily. Take 1/2 tablet by mouth every day)    traZODone (DESYREL) 50 MG tablet Take 1 tablet (50 mg total) by mouth nightly.     Family History       Problem Relation (Age of Onset)    Diabetes Mother, Father    Heart disease Mother, Father          Tobacco Use    Smoking status: Never    Smokeless tobacco: Never   Substance and Sexual Activity    Alcohol use: Not Currently     Comment: rarely - wine    Drug use: Never    Sexual activity: Not on file     Review of Systems   Constitutional:  Positive for appetite change and fatigue. Negative for chills and fever.   Respiratory:  Positive for shortness of breath. Negative for cough and chest tightness.    Cardiovascular:  Positive for chest pain. Negative for leg swelling.   Gastrointestinal:  Positive for diarrhea, nausea and vomiting. Negative for abdominal pain and constipation.    Genitourinary:  Negative for difficulty urinating and dysuria.   Musculoskeletal:  Positive for myalgias. Negative for arthralgias and joint swelling.   Skin:  Negative for rash.   Neurological: Negative.    Psychiatric/Behavioral:  Negative for behavioral problems and confusion. The patient is nervous/anxious.      Objective:     Vital Signs (Most Recent):  Temp: 97.5 °F (36.4 °C) (09/11/24 1052)  Pulse: 80 (09/11/24 1302)  Resp: (!) 22 (09/11/24 1302)  BP: (!) 117/54 (09/11/24 1302)  SpO2: (!) 93 % (09/11/24 1302) Vital Signs (24h Range):  Temp:  [97.5 °F (36.4 °C)] 97.5 °F (36.4 °C)  Pulse:  [60-82] 80  Resp:  [20-25] 22  SpO2:  [89 %-93 %] 93 %  BP: (117-143)/(44-56) 117/54     Weight: 81.6 kg (180 lb)  Body mass index is 32.92 kg/m².     Physical Exam  Vitals and nursing note reviewed.   Constitutional:       Comments: Anxious appearing.   HENT:      Head: Normocephalic and atraumatic.      Nose: Nose normal.   Eyes:      Conjunctiva/sclera: Conjunctivae normal.   Cardiovascular:      Rate and Rhythm: Normal rate and regular rhythm.      Pulses: Normal pulses.      Heart sounds: Murmur heard.      No gallop.   Pulmonary:      Effort: Pulmonary effort is normal.      Breath sounds: Normal breath sounds. No wheezing, rhonchi or rales.   Abdominal:      General: Bowel sounds are normal. There is no distension.      Palpations: Abdomen is soft.      Tenderness: There is abdominal tenderness. There is no guarding.   Musculoskeletal:         General: No swelling or deformity. Normal range of motion.      Cervical back: Normal range of motion and neck supple.      Right lower leg: No edema.      Left lower leg: No edema.   Skin:     General: Skin is warm and dry.      Findings: No rash.   Neurological:      General: No focal deficit present.      Mental Status: She is alert and oriented to person, place, and time. Mental status is at baseline.      Gait: Gait normal.   Psychiatric:         Mood and Affect: Mood  normal.         Thought Content: Thought content normal.         Judgment: Judgment normal.                Significant Labs: All pertinent labs within the past 24 hours have been reviewed.  Recent Lab Results         09/11/24  1318   09/11/24  1103   09/11/24  1048        Albumin/Globulin Ratio   0.8         Albumin   3.6         ALP   113         ALT   58         Anion Gap   14.0         AST   59         Baso #   0.01         Basophil %   0.1         BILIRUBIN TOTAL   1.4         BNP   131.8         BUN   21.0         BUN/CREAT RATIO   19         Calcium   9.4         Chloride   98         CO2   20         Creatinine   1.13         eGFR   54         Eos #   0.00         Eos %   0.0         Globulin, Total   4.5         Glucose   182         Hematocrit   34.2         Hemoglobin   11.1         Immature Grans (Abs)   0.04         Immature Granulocytes   0.4         Lipase   21         Lymph #   0.58         LYMPH %   5.8         Magnesium    2.30         MCH   29.6         MCHC   32.5         MCV   91.2         Mono #   0.70         Mono %   7.0         MPV   11.4         Neut #   8.64         Neut %   86.7         nRBC   0.0         QRS Duration     148       [facility] QTC Calculation     484       Platelet Count   139         Potassium   4.6         PROTEIN TOTAL   8.1         RBC   3.75         RDW   15.3         Sodium   132         Troponin I 0.060   0.060         WBC   9.97                 Significant Imaging: I have reviewed all pertinent imaging results/findings within the past 24 hours.  Assessment/Plan:     * Hypoxia  Admit to med surg for IV abx and supplemental O2.   Pt's SpO2 was 94% and above on room air during my examination.  Estimated discharge date tomorrow.  Home medications reconciled.   SCDs.  OOB.      Elevated troponin  CIS consulted in ER. Recommended trending troponin x 3. First 2 resulted as 0.06. Pt is now CP free.    Pneumonia of right middle lobe due to Haemophilus influenzae  Respiratory panel  with sensitivities available from 9/9/24.     Antibiotics (From admission, onward)      Start     Stop Route Frequency Ordered    09/12/24 0000  cefTRIAXone (Rocephin) 1 g in D5W 100 mL IVPB (MB+)         09/18/24 2359 IV Every 24 hours (non-standard times) 09/11/24 1327    09/11/24 1230  azithromycin (ZITHROMAX) 500 mg in D5W 250 mL  IVPB (admixture device)         09/12/24 0029 IV ED 1 Time 09/11/24 1219            Microbiology Results (last 7 days)       Procedure Component Value Units Date/Time    Blood culture #2 **CANNOT BE ORDERED STAT** [[id number]]     Order Status: Canceled Specimen: Blood     Blood culture #1 **CANNOT BE ORDERED STAT** [[id number]]     Order Status: Canceled Specimen: Blood             Congestive heart failure (CHF)  Echo    Interpretation Summary    Technically difficult study, No Definity contrast is used in this study.    Left Ventricle: The left ventricle is normal in size. There is mild concentric hypertrophy. Normal wall motion. There is hyperdynamic systolic function with a visually estimated ejection fraction of 70 - 75%. Grade I diastolic dysfunction.    Right Ventricle: Normal right ventricular cavity size. Systolic function is normal.    Mitral Valve: There is no stenosis. The mean pressure gradient across the mitral valve is 2 mmHg at a heart rate of  bpm.    Pulmonic Valve: There is no stenosis.    Increased velocity across aortic valve and LVOT which is probably related to hyperdynamic LV function. Cine images suggests no hemodynamically significant aortic stenosis.    No other significant valvular abnormalities noted in this study.  Last BNP reviewed- and noted below   Recent Labs   Lab 09/11/24  1103   .8*       DENNIS on CPAP  CPAP qhs.      Hypertension  Chronic, controlled. Latest blood pressure and vitals reviewed-     Temp:  [97.5 °F (36.4 °C)]   Pulse:  [60-82]   Resp:  [20-25]   BP: (117-143)/(44-56)   SpO2:  [89 %-93 %] .   Home meds for hypertension were  reviewed and noted below.   Hypertension Medications               furosemide (LASIX) 20 MG tablet Take 20 mg by mouth once daily.    lisinopriL (PRINIVIL,ZESTRIL) 20 MG tablet Take 1 tablet (20 mg total) by mouth once daily.    metoprolol succinate (TOPROL-XL) 25 MG 24 hr tablet Take 25 mg by mouth once daily.    nitroGLYCERIN (NITROSTAT) 0.4 MG SL tablet Place under the tongue.    spironolactone (ALDACTONE) 25 MG tablet Take 1 tablet (25 mg total) by mouth once daily.            While in the hospital, will manage blood pressure as follows; Continue home antihypertensive regimen    Will utilize p.r.n. blood pressure medication only if patient's blood pressure greater than 180/110 and she develops symptoms such as worsening chest pain or shortness of breath.    Hyperlipidemia  Home medications reconciled.       GERD (gastroesophageal reflux disease)  Home medications reconciled.       Anxiety  Home medications reconciled.         VTE Risk Mitigation (From admission, onward)           Ordered     IP VTE HIGH RISK PATIENT  Once         09/11/24 1327     Place sequential compression device  Until discontinued         09/11/24 1327                       On 09/11/2024, patient should be placed in hospital observation services under my care.             LINDA MANCINI DO  Department of Hospital Medicine  [facility] - [facility]

## 2024-09-11 NOTE — HPI
62 yo CF with PMH of CAD, CHF, permanent pacemaker, (Follows with Dr. Gerber), HTN, DENNIS on CPAP, GERD, NAFLD, anxiety, and depression presented to the ED with 5 days of SOB, generalized weakness, fatigue, and loss of appetite.  She also complained of CP. She presented to an urgent care at [facility] on Monday and was given augmentin. She then developed N/V/D that she attributes to the Augmentin. She presented to the ER due to persistent and worsening symptoms. She lives alone. In the ER, vitals were significant for SpO2 as low as 89% and RR up to 38 bpm. Labs showed toponin 0.06, .8, Cr 1.13, Na 132, wbc nml at 9.97, and CXR with right perihilar consolidation concerning for pna. Respiratory panel collected Monday at urgent care revealed Haemophilus influenzae, pansensitive, including augmentin and ceftriaxone. Pt was started on ceftriaxone and azithromycin, given 500 cc NS, and 2 LPM supplemental O2 via NC. CIS was consulted for CP and elevated troponin and recommended trending troponins, likely type 2 MI due to hypoxia. Hospitalist was consulted for admission for observation.   Upon my examination, pt appears anxious. She is lying in bed, on room air, with SpO2 94% and above. Audible wheezing heard from the bedside while she is speaking, in between words. Likely from her throat, as her lungs are clear on auscultation. Wheezing resolves when pt calms. She states that she has been using her CPAP at home during the day due to her SOB. She reports that she had COVID 1 month ago, and has repeatedly gotten sick since then. She states she is feeling somewhat better than when she arrived, upon my examination.
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
## 2024-09-11 NOTE — ASSESSMENT & PLAN NOTE
Admit to med surg for IV abx and supplemental O2.   Pt's SpO2 was 94% and above on room air during my examination.  Estimated discharge date tomorrow.  Home medications reconciled.   SCDs.  OOB.

## 2024-09-11 NOTE — TELEMEDICINE CONSULT
[facility] - Emergency Dept    Cardiology  Consult Note    Patient Name: Donna Graham  MRN: [id number]  Admission Date: 9/11/2024  Hospital Length of Stay: 0 days  Code Status: Prior   Attending Provider: Ernesto Macias MD   Consulting Provider: Abbe Cummings MD  Primary Care Physician: Catie Jiang NP  Principal Problem:<principal problem not specified>    Patient information was obtained from patient and ER records.     Subjective:     Chief Complaint/Reason for Consult: mild increase in troponin amd BNP    HPI: HPI     63-year-old female with a past medical history of CHF, CAD, HLD, HTN presenting to the emergency department for evaluation of shortness for breath.  Patient evaluated 2 days ago in PCP office and was diagnosed with Haemophilus influenzae pneumonia.  She has been started on Augmentin.  Received a prescription yesterday.  She notes continued shortness for breath.  She was given an albuterol inhaler which improved symptoms but she has not used it today.  She notes mild pain, pleuritic, to the anterior bilateral lower costal margins.  She denies leg swelling, pain.  She denies chest pain,, palpitations,  hemoptysis, hematemesis, melena, hematochezia, dysuria, hematuria, numbness, tingling, vision changes.  She notes myalgias, fever over the last few days, frontal headache.  She notes multiple episodes of diarrhea and nausea with vomiting, nonbilious nonbloody.  Denies any trauma.  No other mitigating or exacerbating factors.  Patient's troponin was 0.06 and BNP was 131.  Patient is having no cardiovascular symptoms she has pleuritic chest pain secondary to pneumonia.          Previous Cardiac Diagnostics:   Cardiac catheterization October 2022 PCI and stenting of the RCA mild disease in the LAD and circumflex  Permanent pacemaker implanted    Past Medical History:   Diagnosis Date    Cancer     Coronary artery disease     Depression     Hyperlipidemia     Hypertension     Sleep  apnea      Past Surgical History:   Procedure Laterality Date    ATHERECTOMY  10/20/2022    Procedure: Atherectomy;  Surgeon: Aron Pereyra MD;  Location: [facility] CATH LAB;  Service: Cardiology;;    INSERT / REPLACE / REMOVE PACEMAKER      08/2023    LEFT HEART CATHETERIZATION Left 10/20/2022    Procedure: CATHETERIZATION, HEART, LEFT;  Surgeon: Aron Pereyra MD;  Location: [facility] CATH LAB;  Service: Cardiology;  Laterality: Left;  LHC +/- PCI    MASTECTOMY      REPLACEMENT OF DUAL CHAMBER PACEMAKER GENERATOR N/A 07/26/2023    Procedure: REPLACEMENT, PULSE GENERATOR OF DUAL CHAMBER PACEMAKER;  Surgeon: Feliz Yo MD;  Location: [facility] CATH LAB;  Service: Cardiology;  Laterality: N/A;  DUAL CHAMBER PPM GEN CHANGE  (BS)    TONSILLECTOMY       Review of patient's allergies indicates:   Allergen Reactions    Codeine Itching and Nausea And Vomiting     and causing itching.   and causing itching.   and causing itching.       Sulfa (sulfonamide antibiotics) Itching and Nausea And Vomiting     Current Facility-Administered Medications on File Prior to Encounter   Medication    cefazolin (ANCEF) 2 gram in dextrose 5% 50 mL IVPB (premix)    sodium chloride 0.9% flush 10 mL     Current Outpatient Medications on File Prior to Encounter   Medication Sig    aspirin (ECOTRIN) 81 MG EC tablet Take 81 mg by mouth nightly.    clopidogreL (PLAVIX) 75 mg tablet Take 1 tablet (75 mg total) by mouth once daily.    cyclobenzaprine (FLEXERIL) 5 MG tablet Take 5 mg by mouth daily as needed.    ezetimibe (ZETIA) 10 mg tablet Take 10 mg by mouth once daily.    furosemide (LASIX) 20 MG tablet Take 20 mg by mouth once daily.    gabapentin (NEURONTIN) 300 MG capsule TAKE 1 CAPSULE(300 MG) BY MOUTH THREE TIMES DAILY    lisinopriL (PRINIVIL,ZESTRIL) 20 MG tablet Take 1 tablet (20 mg total) by mouth once daily.    meloxicam (MOBIC) 15 MG tablet TAKE 1 TABLET BY MOUTH EVERY DAY AS NEEDED FOR PAIN WITH FOOD (Patient not taking: Reported  on 2/19/2024)    metoprolol succinate (TOPROL-XL) 25 MG 24 hr tablet Take 25 mg by mouth once daily.    nitroGLYCERIN (NITROSTAT) 0.4 MG SL tablet Place under the tongue.    omeprazole (PRILOSEC OTC) 20 MG tablet Take 1 tablet (20 mg total) by mouth every morning.    pantoprazole (PROTONIX) 40 MG tablet Take 40 mg by mouth once daily.    paroxetine (PAXIL) 30 MG tablet Take 1 tablet (30 mg total) by mouth every morning.    rosuvastatin (CRESTOR) 10 MG tablet Take 1 tablet (10 mg total) by mouth nightly. (Patient taking differently: Take 10 mg by mouth every morning.)    spironolactone (ALDACTONE) 25 MG tablet Take 1 tablet (25 mg total) by mouth once daily. (Patient taking differently: Take 12.5 mg by mouth once daily. Take 1/2 tablet by mouth every day)    traZODone (DESYREL) 50 MG tablet Take 1 tablet (50 mg total) by mouth nightly.     Family History       Problem Relation (Age of Onset)    Diabetes Mother, Father    Heart disease Mother, Father          Tobacco Use    Smoking status: Never    Smokeless tobacco: Never   Substance and Sexual Activity    Alcohol use: Not Currently     Comment: rarely - wine    Drug use: Never    Sexual activity: Not on file       Review of Systems   Constitutional:  Positive for activity change and fatigue.   Respiratory:  Positive for shortness of breath.    Cardiovascular:  Positive for chest pain.   All other systems reviewed and are negative.        Objective:     Vital Signs (Most Recent):  Temp: 97.5 °F (36.4 °C) (09/11/24 1052)  Pulse: 60 (09/11/24 1123)  Resp: 20 (09/11/24 1123)  BP: (!) 139/56 (09/11/24 1052)  SpO2: (!) 89 % (09/11/24 1159) Vital Signs (24h Range):  Temp:  [97.5 °F (36.4 °C)] 97.5 °F (36.4 °C)  Pulse:  [60-66] 60  Resp:  [20] 20  SpO2:  [89 %-93 %] 89 %  BP: (139)/(56) 139/56   Weight: 81.6 kg (180 lb)  Body mass index is 32.92 kg/m².  SpO2: (!) 89 %     No intake or output data in the 24 hours ending 09/11/24 1250  Lines/Drains/Airways       Peripheral  "Intravenous Line  Duration                  Peripheral IV - Single Lumen 09/11/24 1056 20 G Anterior;Left;Proximal Forearm <1 day                  Significant Labs:   Chemistries:   Recent Labs   Lab 09/11/24  1103   *   K 4.6   CL 98   CO2 20*   BUN 21.0*   CREATININE 1.13*   CALCIUM 9.4   BILITOT 1.4   ALKPHOS 113   ALT 58*   AST 59*   GLUCOSE 182*   MG 2.30   TROPONINI 0.060*        CBC/Anemia Labs: Coags:    Recent Labs   Lab 09/11/24  1103   WBC 9.97   HGB 11.1*   HCT 34.2*      MCV 91.2   RDW 15.3    No results for input(s): "PT", "INR", "APTT" in the last 168 hours.     Significant Imaging:  Imaging Results              X-Ray Chest PA And Lateral (Final result)  Result time 09/11/24 11:21:46      Final result by Oliva Hinds MD (09/11/24 11:21:46)                   Impression:      New right perihilar consolidation concerning for pneumonia.      Electronically signed by: Oliva Hinds  Date:    09/11/2024  Time:    11:21               Narrative:    EXAMINATION:  XR CHEST PA AND LATERAL    CLINICAL HISTORY:  Shortness of breath    TECHNIQUE:  PA and lateral chest radiographs    COMPARISON:  Chest x-ray dated 02/11/2024    FINDINGS:  The heart is normal in size.  There is a new right perihilar consolidation.  There is no pleural effusion or pneumothorax.                                    EKG:  Paced rhythm 82 beats per minute    Telemetry:  Paced rhythm    Physical Exam  Constitutional:       Appearance: She is obese. She is ill-appearing.   HENT:      Head: Normocephalic.   Cardiovascular:      Rate and Rhythm: Normal rate and regular rhythm.      Pulses: Normal pulses.      Heart sounds: Normal heart sounds.   Pulmonary:      Effort: No respiratory distress.      Breath sounds: Rhonchi present.   Chest:      Chest wall: Tenderness present.   Abdominal:      General: Abdomen is flat.   Musculoskeletal:      Right lower leg: No edema.      Left lower leg: No edema.   Neurological:      " General: No focal deficit present.      Mental Status: She is alert.   Psychiatric:         Mood and Affect: Mood normal.           Home Medications:   Current Facility-Administered Medications on File Prior to Encounter   Medication Dose Route Frequency Provider Last Rate Last Admin    cefazolin (ANCEF) 2 gram in dextrose 5% 50 mL IVPB (premix)  2 g Intravenous On Call Procedure Feliz Goff  mL/hr at 07/26/23 1218 2 g at 07/26/23 1218    sodium chloride 0.9% flush 10 mL  10 mL Intravenous PRN Feliz Goff MD         Current Outpatient Medications on File Prior to Encounter   Medication Sig Dispense Refill    aspirin (ECOTRIN) 81 MG EC tablet Take 81 mg by mouth nightly.      clopidogreL (PLAVIX) 75 mg tablet Take 1 tablet (75 mg total) by mouth once daily. 90 tablet 3    cyclobenzaprine (FLEXERIL) 5 MG tablet Take 5 mg by mouth daily as needed.      ezetimibe (ZETIA) 10 mg tablet Take 10 mg by mouth once daily.      furosemide (LASIX) 20 MG tablet Take 20 mg by mouth once daily.      gabapentin (NEURONTIN) 300 MG capsule TAKE 1 CAPSULE(300 MG) BY MOUTH THREE TIMES DAILY 90 capsule 3    lisinopriL (PRINIVIL,ZESTRIL) 20 MG tablet Take 1 tablet (20 mg total) by mouth once daily. 30 tablet 3    meloxicam (MOBIC) 15 MG tablet TAKE 1 TABLET BY MOUTH EVERY DAY AS NEEDED FOR PAIN WITH FOOD (Patient not taking: Reported on 2/19/2024) 30 tablet 3    metoprolol succinate (TOPROL-XL) 25 MG 24 hr tablet Take 25 mg by mouth once daily.      nitroGLYCERIN (NITROSTAT) 0.4 MG SL tablet Place under the tongue.      omeprazole (PRILOSEC OTC) 20 MG tablet Take 1 tablet (20 mg total) by mouth every morning. 30 tablet 3    pantoprazole (PROTONIX) 40 MG tablet Take 40 mg by mouth once daily.      paroxetine (PAXIL) 30 MG tablet Take 1 tablet (30 mg total) by mouth every morning. 30 tablet 11    rosuvastatin (CRESTOR) 10 MG tablet Take 1 tablet (10 mg total) by mouth nightly. (Patient taking  differently: Take 10 mg by mouth every morning.) 30 tablet 3    spironolactone (ALDACTONE) 25 MG tablet Take 1 tablet (25 mg total) by mouth once daily. (Patient taking differently: Take 12.5 mg by mouth once daily. Take 1/2 tablet by mouth every day) 30 tablet 3    traZODone (DESYREL) 50 MG tablet Take 1 tablet (50 mg total) by mouth nightly. 30 tablet 3     Current Schedule Inpatient Medications:   azithromycin  500 mg Intravenous ED 1 Time    cefTRIAXone (Rocephin) IV (PEDS and ADULTS)  1 g Intravenous ED 1 Time     Continuous Infusions:    Assessment:   Patient is a 63-year-old female with pneumonia mildly elevated troponin and BNP no signs of congestive heart failure or acute coronary syndrome      Plan:   Recommend trending troponins I see no evidence of acute coronary syndrome.  Continue current home cardiac medications.  Patient will be admitted for treatment of pneumonia no indication for transfer at this point.                           A minimum of two characteristics is recommended for diagnosis of either severe or non-severe malnutrition.    Thank you for your consult.     Abbe Cummings MD  Cardiology  [facility] - Emergency Dept  09/11/2024

## 2024-09-11 NOTE — ASSESSMENT & PLAN NOTE
CIS consulted in ER. Recommended trending troponin x 3. First 2 resulted as 0.06. Pt is now CP free.

## 2024-09-11 NOTE — ASSESSMENT & PLAN NOTE
Respiratory panel with sensitivities available from 9/9/24.     Antibiotics (From admission, onward)      Start     Stop Route Frequency Ordered    09/12/24 0000  cefTRIAXone (Rocephin) 1 g in D5W 100 mL IVPB (MB+)         09/18/24 2359 IV Every 24 hours (non-standard times) 09/11/24 1327    09/11/24 1230  azithromycin (ZITHROMAX) 500 mg in D5W 250 mL  IVPB (admixture device)         09/12/24 0029 IV ED 1 Time 09/11/24 1219            Microbiology Results (last 7 days)       Procedure Component Value Units Date/Time    Blood culture #2 **CANNOT BE ORDERED STAT** [[id number]]     Order Status: Canceled Specimen: Blood     Blood culture #1 **CANNOT BE ORDERED STAT** [[id number]]     Order Status: Canceled Specimen: Blood

## 2024-09-11 NOTE — SUBJECTIVE & OBJECTIVE
Past Medical History:   Diagnosis Date    Cancer     Coronary artery disease     Depression     Hyperlipidemia     Hypertension     Sleep apnea        Past Surgical History:   Procedure Laterality Date    ATHERECTOMY  10/20/2022    Procedure: Atherectomy;  Surgeon: Aron Pereyra MD;  Location: [facility] CATH LAB;  Service: Cardiology;;    INSERT / REPLACE / REMOVE PACEMAKER      08/2023    LEFT HEART CATHETERIZATION Left 10/20/2022    Procedure: CATHETERIZATION, HEART, LEFT;  Surgeon: Aron Pereyra MD;  Location: [facility] CATH LAB;  Service: Cardiology;  Laterality: Left;  LHC +/- PCI    MASTECTOMY      REPLACEMENT OF DUAL CHAMBER PACEMAKER GENERATOR N/A 07/26/2023    Procedure: REPLACEMENT, PULSE GENERATOR OF DUAL CHAMBER PACEMAKER;  Surgeon: Feliz Yo MD;  Location: [facility] CATH LAB;  Service: Cardiology;  Laterality: N/A;  DUAL CHAMBER PPM GEN CHANGE  (BS)    TONSILLECTOMY         Review of patient's allergies indicates:   Allergen Reactions    Codeine Itching and Nausea And Vomiting     and causing itching.   and causing itching.   and causing itching.       Sulfa (sulfonamide antibiotics) Itching and Nausea And Vomiting       Current Facility-Administered Medications on File Prior to Encounter   Medication    cefazolin (ANCEF) 2 gram in dextrose 5% 50 mL IVPB (premix)    sodium chloride 0.9% flush 10 mL     Current Outpatient Medications on File Prior to Encounter   Medication Sig    aspirin (ECOTRIN) 81 MG EC tablet Take 81 mg by mouth nightly.    clopidogreL (PLAVIX) 75 mg tablet Take 1 tablet (75 mg total) by mouth once daily.    cyclobenzaprine (FLEXERIL) 5 MG tablet Take 5 mg by mouth daily as needed.    ezetimibe (ZETIA) 10 mg tablet Take 10 mg by mouth once daily.    furosemide (LASIX) 20 MG tablet Take 20 mg by mouth once daily.    gabapentin (NEURONTIN) 300 MG capsule TAKE 1 CAPSULE(300 MG) BY MOUTH THREE TIMES DAILY    lisinopriL (PRINIVIL,ZESTRIL) 20 MG tablet Take 1 tablet (20 mg total) by  mouth once daily.    meloxicam (MOBIC) 15 MG tablet TAKE 1 TABLET BY MOUTH EVERY DAY AS NEEDED FOR PAIN WITH FOOD (Patient not taking: Reported on 2/19/2024)    metoprolol succinate (TOPROL-XL) 25 MG 24 hr tablet Take 25 mg by mouth once daily.    nitroGLYCERIN (NITROSTAT) 0.4 MG SL tablet Place under the tongue.    omeprazole (PRILOSEC OTC) 20 MG tablet Take 1 tablet (20 mg total) by mouth every morning.    pantoprazole (PROTONIX) 40 MG tablet Take 40 mg by mouth once daily.    paroxetine (PAXIL) 30 MG tablet Take 1 tablet (30 mg total) by mouth every morning.    rosuvastatin (CRESTOR) 10 MG tablet Take 1 tablet (10 mg total) by mouth nightly. (Patient taking differently: Take 10 mg by mouth every morning.)    spironolactone (ALDACTONE) 25 MG tablet Take 1 tablet (25 mg total) by mouth once daily. (Patient taking differently: Take 12.5 mg by mouth once daily. Take 1/2 tablet by mouth every day)    traZODone (DESYREL) 50 MG tablet Take 1 tablet (50 mg total) by mouth nightly.     Family History       Problem Relation (Age of Onset)    Diabetes Mother, Father    Heart disease Mother, Father          Tobacco Use    Smoking status: Never    Smokeless tobacco: Never   Substance and Sexual Activity    Alcohol use: Not Currently     Comment: rarely - wine    Drug use: Never    Sexual activity: Not on file     Review of Systems   Constitutional:  Positive for appetite change and fatigue. Negative for chills and fever.   Respiratory:  Positive for shortness of breath. Negative for cough and chest tightness.    Cardiovascular:  Positive for chest pain. Negative for leg swelling.   Gastrointestinal:  Positive for diarrhea, nausea and vomiting. Negative for abdominal pain and constipation.   Genitourinary:  Negative for difficulty urinating and dysuria.   Musculoskeletal:  Positive for myalgias. Negative for arthralgias and joint swelling.   Skin:  Negative for rash.   Neurological: Negative.    Psychiatric/Behavioral:   Negative for behavioral problems and confusion. The patient is nervous/anxious.      Objective:     Vital Signs (Most Recent):  Temp: 97.5 °F (36.4 °C) (09/11/24 1052)  Pulse: 80 (09/11/24 1302)  Resp: (!) 22 (09/11/24 1302)  BP: (!) 117/54 (09/11/24 1302)  SpO2: (!) 93 % (09/11/24 1302) Vital Signs (24h Range):  Temp:  [97.5 °F (36.4 °C)] 97.5 °F (36.4 °C)  Pulse:  [60-82] 80  Resp:  [20-25] 22  SpO2:  [89 %-93 %] 93 %  BP: (117-143)/(44-56) 117/54     Weight: 81.6 kg (180 lb)  Body mass index is 32.92 kg/m².     Physical Exam  Vitals and nursing note reviewed.   Constitutional:       Comments: Anxious appearing.   HENT:      Head: Normocephalic and atraumatic.      Nose: Nose normal.   Eyes:      Conjunctiva/sclera: Conjunctivae normal.   Cardiovascular:      Rate and Rhythm: Normal rate and regular rhythm.      Pulses: Normal pulses.      Heart sounds: Murmur heard.      No gallop.   Pulmonary:      Effort: Pulmonary effort is normal.      Breath sounds: Normal breath sounds. No wheezing, rhonchi or rales.   Abdominal:      General: Bowel sounds are normal. There is no distension.      Palpations: Abdomen is soft.      Tenderness: There is abdominal tenderness. There is no guarding.   Musculoskeletal:         General: No swelling or deformity. Normal range of motion.      Cervical back: Normal range of motion and neck supple.      Right lower leg: No edema.      Left lower leg: No edema.   Skin:     General: Skin is warm and dry.      Findings: No rash.   Neurological:      General: No focal deficit present.      Mental Status: She is alert and oriented to person, place, and time. Mental status is at baseline.      Gait: Gait normal.   Psychiatric:         Mood and Affect: Mood normal.         Thought Content: Thought content normal.         Judgment: Judgment normal.                Significant Labs: All pertinent labs within the past 24 hours have been reviewed.  Recent Lab Results         09/11/24  1252    09/11/24  1103   09/11/24  1048        Albumin/Globulin Ratio   0.8         Albumin   3.6         ALP   113         ALT   58         Anion Gap   14.0         AST   59         Baso #   0.01         Basophil %   0.1         BILIRUBIN TOTAL   1.4         BNP   131.8         BUN   21.0         BUN/CREAT RATIO   19         Calcium   9.4         Chloride   98         CO2   20         Creatinine   1.13         eGFR   54         Eos #   0.00         Eos %   0.0         Globulin, Total   4.5         Glucose   182         Hematocrit   34.2         Hemoglobin   11.1         Immature Grans (Abs)   0.04         Immature Granulocytes   0.4         Lipase   21         Lymph #   0.58         LYMPH %   5.8         Magnesium    2.30         MCH   29.6         MCHC   32.5         MCV   91.2         Mono #   0.70         Mono %   7.0         MPV   11.4         Neut #   8.64         Neut %   86.7         nRBC   0.0         QRS Duration     148       [facility] QTC Calculation     484       Platelet Count   139         Potassium   4.6         PROTEIN TOTAL   8.1         RBC   3.75         RDW   15.3         Sodium   132         Troponin I 0.060   0.060         WBC   9.97                 Significant Imaging: I have reviewed all pertinent imaging results/findings within the past 24 hours.

## 2024-09-12 VITALS
OXYGEN SATURATION: 96 % | TEMPERATURE: 98 F | BODY MASS INDEX: 35.49 KG/M2 | HEART RATE: 66 BPM | HEIGHT: 62 IN | RESPIRATION RATE: 20 BRPM | DIASTOLIC BLOOD PRESSURE: 65 MMHG | WEIGHT: 192.88 LBS | SYSTOLIC BLOOD PRESSURE: 133 MMHG

## 2024-09-12 PROBLEM — R09.02 HYPOXIA: Status: RESOLVED | Noted: 2024-09-11 | Resolved: 2024-09-12

## 2024-09-12 PROBLEM — R79.89 ELEVATED TROPONIN: Status: RESOLVED | Noted: 2024-09-11 | Resolved: 2024-09-12

## 2024-09-12 LAB
ALBUMIN SERPL-MCNC: 3.3 G/DL (ref 3.4–4.8)
ALBUMIN/GLOB SERPL: 0.9 RATIO (ref 1.1–2)
ALP SERPL-CCNC: 106 UNIT/L (ref 40–150)
ALT SERPL-CCNC: 51 UNIT/L (ref 0–55)
ANION GAP SERPL CALC-SCNC: 11 MEQ/L
AST SERPL-CCNC: 37 UNIT/L (ref 5–34)
BASOPHILS # BLD AUTO: 0.01 X10(3)/MCL
BASOPHILS NFR BLD AUTO: 0.1 %
BILIRUB SERPL-MCNC: 0.8 MG/DL
BUN SERPL-MCNC: 20 MG/DL (ref 9.8–20.1)
CALCIUM SERPL-MCNC: 8.9 MG/DL (ref 8.4–10.2)
CHLORIDE SERPL-SCNC: 100 MMOL/L (ref 98–107)
CO2 SERPL-SCNC: 22 MMOL/L (ref 23–31)
CREAT SERPL-MCNC: 0.91 MG/DL (ref 0.55–1.02)
CREAT/UREA NIT SERPL: 22
CRP SERPL-MCNC: 356.8 MG/L
EOSINOPHIL # BLD AUTO: 0 X10(3)/MCL (ref 0–0.9)
EOSINOPHIL NFR BLD AUTO: 0 %
ERYTHROCYTE [DISTWIDTH] IN BLOOD BY AUTOMATED COUNT: 15.1 % (ref 11.5–17)
EST. AVERAGE GLUCOSE BLD GHB EST-MCNC: 128.4 MG/DL
GFR SERPLBLD CREATININE-BSD FMLA CKD-EPI: >60 ML/MIN/1.73/M2
GLOBULIN SER-MCNC: 3.5 GM/DL (ref 2.4–3.5)
GLUCOSE SERPL-MCNC: 270 MG/DL (ref 82–115)
HBA1C MFR BLD: 6.1 %
HCT VFR BLD AUTO: 31.6 % (ref 37–47)
HGB BLD-MCNC: 10.3 G/DL (ref 12–16)
IMM GRANULOCYTES # BLD AUTO: 0.06 X10(3)/MCL (ref 0–0.04)
IMM GRANULOCYTES NFR BLD AUTO: 0.7 %
LYMPHOCYTES # BLD AUTO: 0.49 X10(3)/MCL (ref 0.6–4.6)
LYMPHOCYTES NFR BLD AUTO: 5.8 %
MCH RBC QN AUTO: 29.5 PG (ref 27–31)
MCHC RBC AUTO-ENTMCNC: 32.6 G/DL (ref 33–36)
MCV RBC AUTO: 90.5 FL (ref 80–94)
MONOCYTES # BLD AUTO: 0.53 X10(3)/MCL (ref 0.1–1.3)
MONOCYTES NFR BLD AUTO: 6.2 %
NEUTROPHILS # BLD AUTO: 7.41 X10(3)/MCL (ref 2.1–9.2)
NEUTROPHILS NFR BLD AUTO: 87.2 %
NRBC BLD AUTO-RTO: 0 %
PLATELET # BLD AUTO: 136 X10(3)/MCL (ref 130–400)
PMV BLD AUTO: 10.9 FL (ref 7.4–10.4)
POCT GLUCOSE: 324 MG/DL (ref 70–110)
POTASSIUM SERPL-SCNC: 4 MMOL/L (ref 3.5–5.1)
PROCALCITONIN SERPL-MCNC: 3.47 NG/ML
PROT SERPL-MCNC: 6.8 GM/DL (ref 5.8–7.6)
RBC # BLD AUTO: 3.49 X10(6)/MCL (ref 4.2–5.4)
SODIUM SERPL-SCNC: 133 MMOL/L (ref 136–145)
WBC # BLD AUTO: 8.5 X10(3)/MCL (ref 4.5–11.5)

## 2024-09-12 PROCEDURE — 94640 AIRWAY INHALATION TREATMENT: CPT | Mod: XB

## 2024-09-12 PROCEDURE — 63600175 PHARM REV CODE 636 W HCPCS: Performed by: INTERNAL MEDICINE

## 2024-09-12 PROCEDURE — 80053 COMPREHEN METABOLIC PANEL: CPT | Performed by: INTERNAL MEDICINE

## 2024-09-12 PROCEDURE — 83036 HEMOGLOBIN GLYCOSYLATED A1C: CPT | Performed by: INTERNAL MEDICINE

## 2024-09-12 PROCEDURE — 96372 THER/PROPH/DIAG INJ SC/IM: CPT | Performed by: INTERNAL MEDICINE

## 2024-09-12 PROCEDURE — G0378 HOSPITAL OBSERVATION PER HR: HCPCS

## 2024-09-12 PROCEDURE — 96367 TX/PROPH/DG ADDL SEQ IV INF: CPT

## 2024-09-12 PROCEDURE — 85025 COMPLETE CBC W/AUTO DIFF WBC: CPT | Performed by: INTERNAL MEDICINE

## 2024-09-12 PROCEDURE — 99900035 HC TECH TIME PER 15 MIN (STAT)

## 2024-09-12 PROCEDURE — 25000242 PHARM REV CODE 250 ALT 637 W/ HCPCS: Performed by: INTERNAL MEDICINE

## 2024-09-12 PROCEDURE — 94761 N-INVAS EAR/PLS OXIMETRY MLT: CPT

## 2024-09-12 PROCEDURE — 27000221 HC OXYGEN, UP TO 24 HOURS

## 2024-09-12 PROCEDURE — 96366 THER/PROPH/DIAG IV INF ADDON: CPT

## 2024-09-12 PROCEDURE — 36415 COLL VENOUS BLD VENIPUNCTURE: CPT | Performed by: INTERNAL MEDICINE

## 2024-09-12 PROCEDURE — 25000003 PHARM REV CODE 250: Performed by: INTERNAL MEDICINE

## 2024-09-12 RX ORDER — AZITHROMYCIN 500 MG/1
500 TABLET, FILM COATED ORAL DAILY
Qty: 1 TABLET | Refills: 0 | Status: SHIPPED | OUTPATIENT
Start: 2024-09-13 | End: 2024-09-12

## 2024-09-12 RX ORDER — CEFDINIR 300 MG/1
300 CAPSULE ORAL 2 TIMES DAILY
Qty: 14 CAPSULE | Refills: 0 | Status: SHIPPED | OUTPATIENT
Start: 2024-09-12 | End: 2024-09-19

## 2024-09-12 RX ORDER — GLUCAGON 1 MG
1 KIT INJECTION
Status: DISCONTINUED | OUTPATIENT
Start: 2024-09-12 | End: 2024-09-12 | Stop reason: HOSPADM

## 2024-09-12 RX ORDER — INSULIN ASPART 100 [IU]/ML
0-5 INJECTION, SOLUTION INTRAVENOUS; SUBCUTANEOUS
Status: DISCONTINUED | OUTPATIENT
Start: 2024-09-12 | End: 2024-09-12 | Stop reason: HOSPADM

## 2024-09-12 RX ORDER — CEFDINIR 300 MG/1
300 CAPSULE ORAL 2 TIMES DAILY
Qty: 14 CAPSULE | Refills: 0 | Status: SHIPPED | OUTPATIENT
Start: 2024-09-12 | End: 2024-09-12

## 2024-09-12 RX ORDER — IPRATROPIUM BROMIDE AND ALBUTEROL SULFATE 2.5; .5 MG/3ML; MG/3ML
3 SOLUTION RESPIRATORY (INHALATION) EVERY 6 HOURS PRN
Qty: 75 ML | Refills: 0 | Status: SHIPPED | OUTPATIENT
Start: 2024-09-12 | End: 2024-09-12

## 2024-09-12 RX ORDER — IBUPROFEN 200 MG
16 TABLET ORAL
Status: DISCONTINUED | OUTPATIENT
Start: 2024-09-12 | End: 2024-09-12 | Stop reason: HOSPADM

## 2024-09-12 RX ORDER — IBUPROFEN 200 MG
24 TABLET ORAL
Status: DISCONTINUED | OUTPATIENT
Start: 2024-09-12 | End: 2024-09-12 | Stop reason: HOSPADM

## 2024-09-12 RX ORDER — AZITHROMYCIN 500 MG/1
500 TABLET, FILM COATED ORAL DAILY
Qty: 1 TABLET | Refills: 0 | Status: SHIPPED | OUTPATIENT
Start: 2024-09-13 | End: 2024-09-14

## 2024-09-12 RX ORDER — IPRATROPIUM BROMIDE AND ALBUTEROL SULFATE 2.5; .5 MG/3ML; MG/3ML
3 SOLUTION RESPIRATORY (INHALATION) EVERY 6 HOURS PRN
Qty: 75 ML | Refills: 0 | Status: SHIPPED | OUTPATIENT
Start: 2024-09-12 | End: 2024-09-19

## 2024-09-12 RX ADMIN — INSULIN ASPART 4 UNITS: 100 INJECTION, SOLUTION INTRAVENOUS; SUBCUTANEOUS at 11:09

## 2024-09-12 RX ADMIN — AZITHROMYCIN MONOHYDRATE 500 MG: 500 INJECTION, POWDER, LYOPHILIZED, FOR SOLUTION INTRAVENOUS at 12:09

## 2024-09-12 RX ADMIN — METOPROLOL SUCCINATE 25 MG: 25 TABLET, EXTENDED RELEASE ORAL at 09:09

## 2024-09-12 RX ADMIN — IPRATROPIUM BROMIDE AND ALBUTEROL SULFATE 3 ML: 2.5; .5 SOLUTION RESPIRATORY (INHALATION) at 05:09

## 2024-09-12 RX ADMIN — ATORVASTATIN CALCIUM 20 MG: 10 TABLET, FILM COATED ORAL at 09:09

## 2024-09-12 RX ADMIN — EZETIMIBE 10 MG: 10 TABLET ORAL at 09:09

## 2024-09-12 RX ADMIN — PANTOPRAZOLE SODIUM 40 MG: 40 TABLET, DELAYED RELEASE ORAL at 09:09

## 2024-09-12 RX ADMIN — LISINOPRIL 20 MG: 20 TABLET ORAL at 09:09

## 2024-09-12 RX ADMIN — PAROXETINE 30 MG: 20 TABLET, FILM COATED ORAL at 09:09

## 2024-09-12 RX ADMIN — ACETAMINOPHEN 650 MG: 325 TABLET, FILM COATED ORAL at 10:09

## 2024-09-12 RX ADMIN — CEFTRIAXONE SODIUM 1 G: 1 INJECTION, POWDER, FOR SOLUTION INTRAMUSCULAR; INTRAVENOUS at 11:09

## 2024-09-12 RX ADMIN — IPRATROPIUM BROMIDE AND ALBUTEROL SULFATE 3 ML: 2.5; .5 SOLUTION RESPIRATORY (INHALATION) at 12:09

## 2024-09-12 NOTE — ASSESSMENT & PLAN NOTE
Respiratory panel with sensitivities available from 9/9/24.     Antibiotics (From admission, onward)      Start     Stop Route Frequency Ordered    09/12/24 1200  cefTRIAXone (Rocephin) 1 g in D5W 100 mL IVPB (MB+)         09/19/24 1159 IV Every 24 hours (non-standard times) 09/11/24 2157    09/12/24 1145  azithromycin (ZITHROMAX) 500 mg in D5W 250 mL  IVPB (admixture device)         -- IV Once 09/12/24 1039            Microbiology Results (last 7 days)       Procedure Component Value Units Date/Time    Blood culture #2 **CANNOT BE ORDERED STAT** [[id number]]     Order Status: Canceled Specimen: Blood     Blood culture #1 **CANNOT BE ORDERED STAT** [[id number]]     Order Status: Canceled Specimen: Blood           DC with 3rd dose of azithromycin and 7 days of cefdinir. Also sent rx for albulterol nebs per pt request. States that she has a nebulizer.

## 2024-09-12 NOTE — PLAN OF CARE
Problem: Adult Inpatient Plan of Care  Goal: Plan of Care Review  Outcome: Progressing  Goal: Patient-Specific Goal (Individualized)  Outcome: Progressing  Goal: Absence of Hospital-Acquired Illness or Injury  Outcome: Progressing  Goal: Optimal Comfort and Wellbeing  Outcome: Progressing  Goal: Readiness for Transition of Care  Outcome: Progressing     Problem: Chest Pain  Goal: Resolution of Chest Pain Symptoms  Outcome: Progressing     Problem: Diarrhea  Goal: Effective Diarrhea Management  Outcome: Progressing     Problem: Gas Exchange Impaired  Goal: Optimal Gas Exchange  Outcome: Progressing     Problem: Anxiety  Goal: Anxiety Reduction or Resolution  Outcome: Progressing     Problem: Pneumonia  Goal: Fluid Balance  Outcome: Progressing  Goal: Resolution of Infection Signs and Symptoms  Outcome: Progressing  Goal: Effective Oxygenation and Ventilation  Outcome: Progressing

## 2024-09-12 NOTE — ASSESSMENT & PLAN NOTE
Chronic, controlled. Latest blood pressure and vitals reviewed-     Temp:  [97.5 °F (36.4 °C)-98.4 °F (36.9 °C)]   Pulse:  [60-82]   Resp:  [18-25]   BP: (112-143)/(44-73)   SpO2:  [89 %-98 %] .   Home meds for hypertension were reviewed and noted below.   Hypertension Medications               furosemide (LASIX) 20 MG tablet Take 20 mg by mouth once daily.    lisinopriL (PRINIVIL,ZESTRIL) 20 MG tablet Take 1 tablet (20 mg total) by mouth once daily.    metoprolol succinate (TOPROL-XL) 25 MG 24 hr tablet Take 25 mg by mouth once daily.    nitroGLYCERIN (NITROSTAT) 0.4 MG SL tablet Place under the tongue.    spironolactone (ALDACTONE) 25 MG tablet Take 1 tablet (25 mg total) by mouth once daily.            While in the hospital, will manage blood pressure as follows; Continue home antihypertensive regimen    Will utilize p.r.n. blood pressure medication only if patient's blood pressure greater than 180/110 and she develops symptoms such as worsening chest pain or shortness of breath.

## 2024-09-12 NOTE — ASSESSMENT & PLAN NOTE
CIS consulted in ER. Recommended trending troponin x 3. First 2 resulted as 0.06. Pt is now CP free.    3rd troponin trended down. Pt remained asymptomatic.

## 2024-09-12 NOTE — DISCHARGE SUMMARY
[facility]  [facility] Medicine  Discharge Summary      Patient Name: Donna Graham  MRN: [id number]  ROSIE: [id number]  Patient Class: OP- Observation  Admission Date: 9/11/2024  Hospital Length of Stay: 0 days  Discharge Date and Time:  09/12/2024 10:54 AM  Attending Physician: Farnaz Mckay DO   Discharging Provider: FARNAZ MANCINI DO  Primary Care Provider: Catie Jiang NP    Primary Care Team: Networked reference to record PCT     HPI:   64 yo CF with PMH of CAD, CHF, permanent pacemaker, (Follows with Dr. Gerber), HTN, DENNIS on CPAP, GERD, NAFLD, anxiety, and depression presented to the ED with 5 days of SOB, generalized weakness, fatigue, and loss of appetite.  She also complained of CP. She presented to an urgent care at [facility] on Monday and was given augmentin. She then developed N/V/D that she attributes to the Augmentin. She presented to the ER due to persistent and worsening symptoms. She lives alone. In the ER, vitals were significant for SpO2 as low as 89% and RR up to 38 bpm. Labs showed toponin 0.06, .8, Cr 1.13, Na 132, wbc nml at 9.97, and CXR with right perihilar consolidation concerning for pna. Respiratory panel collected Monday at urgent care revealed Haemophilus influenzae, pansensitive, including augmentin and ceftriaxone. Pt was started on ceftriaxone and azithromycin, given 500 cc NS, and 2 LPM supplemental O2 via NC. CIS was consulted for CP and elevated troponin and recommended trending troponins, likely type 2 MI due to hypoxia. Hospitalist was consulted for admission for observation.   Upon my examination, pt appears anxious. She is lying in bed, on room air, with SpO2 94% and above. Audible wheezing heard from the bedside while she is speaking, in between words. Likely from her throat, as her lungs are clear on auscultation. Wheezing resolves when pt calms. She states that she has been using her CPAP at home during the day  due to her SOB. She reports that she had COVID 1 month ago, and has repeatedly gotten sick since then. She states she is feeling somewhat better than when she arrived, upon my examination.     * No surgery found *      Hospital Course:   9/12/24: Pt is lying in bed comfortably on room air. SpO2 96%. Friend member at bedside. She denies any SOB, sputum production, cp, abd pain, n/v. States she had 1 episode of watery diarrhea last night, but none after that. She tolerated breakfast this morning with no nausea or abd pain. She got out of bed and went to the bathroom with no SOB. She states that she was exposed to second hand smoke most of her adult life and has chronic sob with activities at home. This was being attributed to her heart failure, per pt. Pt is interested in having PFTs done. I recommended she discuss this with her PCP. I explained new rxs. All questions were answered. Pt will be dc'd home after she receives IV rocephin and azithromycin.     PE:   General: alert and oriented.  HEENT: NC, AT.  Resp: left basilar rhonchi. CTA otherwise.  Cardio: RRR, +murmur, no r/g. No edema.  Abd: soft, NT, ND, + BS x 4       Goals of Care Treatment Preferences:  Code Status: Full Code         Consults:     Psychiatric  Anxiety  Home medications reconciled.       Pulmonary  Pneumonia of right middle lobe due to Haemophilus influenzae  Respiratory panel with sensitivities available from 9/9/24.     Antibiotics (From admission, onward)      Start     Stop Route Frequency Ordered    09/12/24 1200  cefTRIAXone (Rocephin) 1 g in D5W 100 mL IVPB (MB+)         09/19/24 1159 IV Every 24 hours (non-standard times) 09/11/24 2157    09/12/24 1145  azithromycin (ZITHROMAX) 500 mg in D5W 250 mL  IVPB (admixture device)         -- IV Once 09/12/24 1039            Microbiology Results (last 7 days)       Procedure Component Value Units Date/Time    Blood culture #2 **CANNOT BE ORDERED STAT** [[id number]]     Order Status: Canceled  Specimen: Blood     Blood culture #1 **CANNOT BE ORDERED STAT** [[id number]]     Order Status: Canceled Specimen: Blood           DC with 3rd dose of azithromycin and 7 days of cefdinir. Also sent rx for albulterol nebs per pt request. States that she has a nebulizer.     Cardiac/Vascular  Congestive heart failure (CHF)  Echo    Interpretation Summary    Technically difficult study, No Definity contrast is used in this study.    Left Ventricle: The left ventricle is normal in size. There is mild concentric hypertrophy. Normal wall motion. There is hyperdynamic systolic function with a visually estimated ejection fraction of 70 - 75%. Grade I diastolic dysfunction.    Right Ventricle: Normal right ventricular cavity size. Systolic function is normal.    Mitral Valve: There is no stenosis. The mean pressure gradient across the mitral valve is 2 mmHg at a heart rate of  bpm.    Pulmonic Valve: There is no stenosis.    Increased velocity across aortic valve and LVOT which is probably related to hyperdynamic LV function. Cine images suggests no hemodynamically significant aortic stenosis.    No other significant valvular abnormalities noted in this study.  Last BNP reviewed- and noted below   Recent Labs   Lab 09/11/24  1103   .8*         Hypertension  Chronic, controlled. Latest blood pressure and vitals reviewed-     Temp:  [97.5 °F (36.4 °C)-98.4 °F (36.9 °C)]   Pulse:  [60-82]   Resp:  [18-25]   BP: (112-143)/(44-73)   SpO2:  [89 %-98 %] .   Home meds for hypertension were reviewed and noted below.   Hypertension Medications               furosemide (LASIX) 20 MG tablet Take 20 mg by mouth once daily.    lisinopriL (PRINIVIL,ZESTRIL) 20 MG tablet Take 1 tablet (20 mg total) by mouth once daily.    metoprolol succinate (TOPROL-XL) 25 MG 24 hr tablet Take 25 mg by mouth once daily.    nitroGLYCERIN (NITROSTAT) 0.4 MG SL tablet Place under the tongue.    spironolactone (ALDACTONE) 25 MG tablet Take 1 tablet (25  mg total) by mouth once daily.            While in the hospital, will manage blood pressure as follows; Continue home antihypertensive regimen    Will utilize p.r.n. blood pressure medication only if patient's blood pressure greater than 180/110 and she develops symptoms such as worsening chest pain or shortness of breath.    Hyperlipidemia  Home medications reconciled.       Elevated troponin-resolved as of 9/12/2024  CIS consulted in ER. Recommended trending troponin x 3. First 2 resulted as 0.06. Pt is now CP free.    3rd troponin trended down. Pt remained asymptomatic.    GI  GERD (gastroesophageal reflux disease)  Home medications reconciled.       Other  DENNIS on CPAP  CPAP qhs.        Final Active Diagnoses:    Diagnosis Date Noted POA    Pneumonia of right middle lobe due to Haemophilus influenzae [J14] 09/11/2024 Yes    Congestive heart failure (CHF) [I50.9] 02/13/2024 Yes    DENNIS on CPAP [G47.33] 08/11/2022 Yes    Hypertension [I10] 08/11/2022 Yes    Anxiety [F41.9] 08/11/2022 Yes    Hyperlipidemia [E78.5] 08/01/2013 Yes    GERD (gastroesophageal reflux disease) [K21.9] 08/01/2013 Yes      Problems Resolved During this Admission:    Diagnosis Date Noted Date Resolved POA    PRINCIPAL PROBLEM:  Hypoxia [R09.02] 09/11/2024 09/12/2024 Yes    Elevated troponin [R79.89] 09/11/2024 09/12/2024 Yes       Discharged Condition: good    Disposition:     Follow Up:   Follow-up Information       Catie Jiang NP Follow up in 1 week(s).    Specialty: Family Medicine  Contact information:  [address] [place] [address] [phone]                           Patient Instructions:   No discharge procedures on file.    Significant Diagnostic Studies: Labs: All labs within the past 24 hours have been reviewed    Pending Diagnostic Studies:       Procedure Component Value Units Date/Time    C-reactive protein [[id number]] Collected: 09/11/24 1318    Order Status: Sent Lab Status: In process Updated: 09/11/24  1334    Specimen: Blood     Procalcitonin [[id number]] Collected: 09/11/24 1318    Order Status: Sent Lab Status: In process Updated: 09/11/24 1334    Specimen: Blood            Medications:  Reconciled Home Medications:      Medication List        START taking these medications      albuterol-ipratropium 2.5 mg-0.5 mg/3 mL nebulizer solution  Commonly known as: DUO-NEB  Take 3 mLs by nebulization every 6 (six) hours as needed for Wheezing. Rescue     azithromycin 500 MG tablet  Commonly known as: ZITHROMAX  Take 1 tablet (500 mg total) by mouth once daily. for 1 day  Start taking on: September 13, 2024     cefdinir 300 MG capsule  Commonly known as: OMNICEF  Take 1 capsule (300 mg total) by mouth 2 (two) times daily. for 7 days            CHANGE how you take these medications      gabapentin 300 MG capsule  Commonly known as: NEURONTIN  TAKE 1 CAPSULE(300 MG) BY MOUTH THREE TIMES DAILY  What changed:   how much to take  when to take this     rosuvastatin 10 MG tablet  Commonly known as: CRESTOR  Take 1 tablet (10 mg total) by mouth nightly.  What changed: when to take this            CONTINUE taking these medications      aspirin 81 MG EC tablet  Commonly known as: ECOTRIN  Take 81 mg by mouth nightly.     cyclobenzaprine 5 MG tablet  Commonly known as: FLEXERIL  Take 5 mg by mouth daily as needed.     ezetimibe 10 mg tablet  Commonly known as: ZETIA  Take 10 mg by mouth once daily.     lisinopriL 20 MG tablet  Commonly known as: PRINIVIL,ZESTRIL  Take 1 tablet (20 mg total) by mouth once daily.     metoprolol succinate 25 MG 24 hr tablet  Commonly known as: TOPROL-XL  Take 25 mg by mouth once daily.     nitroGLYCERIN 0.4 MG SL tablet  Commonly known as: NITROSTAT  Place under the tongue.     pantoprazole 40 MG tablet  Commonly known as: PROTONIX  Take 40 mg by mouth once daily.     paroxetine 30 MG tablet  Commonly known as: PAXIL  Take 1 tablet (30 mg total) by mouth every morning.     spironolactone 25 MG  tablet  Commonly known as: ALDACTONE  Take 1 tablet (25 mg total) by mouth once daily.            STOP taking these medications      clopidogreL 75 mg tablet  Commonly known as: PLAVIX     furosemide 20 MG tablet  Commonly known as: LASIX     meloxicam 15 MG tablet  Commonly known as: MOBIC     omeprazole 20 MG tablet  Commonly known as: PRILOSEC OTC     traZODone 50 MG tablet  Commonly known as: DESYREL              Indwelling Lines/Drains at time of discharge:   Lines/Drains/Airways       None                   Time spent on the discharge of patient: 40 minutes         LINDA MANCINI DO  Department of Hospital Medicine  [facility]

## 2024-09-12 NOTE — HOSPITAL COURSE
9/12/24: Pt is lying in bed comfortably on room air. SpO2 96%. Friend member at bedside. She denies any SOB, sputum production, cp, abd pain, n/v. States she had 1 episode of watery diarrhea last night, but none after that. She tolerated breakfast this morning with no nausea or abd pain. She got out of bed and went to the bathroom with no SOB. She states that she was exposed to second hand smoke most of her adult life and has chronic sob with activities at home. This was being attributed to her heart failure, per pt. Pt is interested in having PFTs done. I recommended she discuss this with her PCP. I explained new rxs. All questions were answered. Pt will be dc'd home after she receives IV rocephin and azithromycin.     PE:   General: alert and oriented.  HEENT: NC, AT.  Resp: left basilar rhonchi. CTA otherwise.  Cardio: RRR, +murmur, no r/g. No edema.  Abd: soft, NT, ND, + BS x 4

## 2024-09-12 NOTE — DISCHARGE INSTRUCTIONS
You have 3 medications that were sent to [facility]: Cefdinir (antibiotic), Azithromycin (antibiotic) and albuterol ipratropium. Make sure you complete your course of antibiotics.

## 2024-09-12 NOTE — ASSESSMENT & PLAN NOTE
Echo    Interpretation Summary    Technically difficult study, No Definity contrast is used in this study.    Left Ventricle: The left ventricle is normal in size. There is mild concentric hypertrophy. Normal wall motion. There is hyperdynamic systolic function with a visually estimated ejection fraction of 70 - 75%. Grade I diastolic dysfunction.    Right Ventricle: Normal right ventricular cavity size. Systolic function is normal.    Mitral Valve: There is no stenosis. The mean pressure gradient across the mitral valve is 2 mmHg at a heart rate of  bpm.    Pulmonic Valve: There is no stenosis.    Increased velocity across aortic valve and LVOT which is probably related to hyperdynamic LV function. Cine images suggests no hemodynamically significant aortic stenosis.    No other significant valvular abnormalities noted in this study.  Last BNP reviewed- and noted below   Recent Labs   Lab 09/11/24  1103   .8*

## 2024-09-17 ENCOUNTER — PATIENT OUTREACH (OUTPATIENT)
Dept: ADMINISTRATIVE | Facility: CLINIC | Age: 64
End: 2024-09-17
Payer: COMMERCIAL

## 2024-09-17 NOTE — PROGRESS NOTES
C3 nurse attempted to contact Donna Graham  for a TCC post hospital discharge follow up call. No answer. The patient does not have a scheduled HOSFU appointment, and the pt [facility].

## 2024-09-18 NOTE — PROGRESS NOTES
3rd attempt made to reach patient for TCC call. Left voicemail please call [phone] leave first name, last, and date of birth for Adelita. I will return your call.

## 2024-09-18 NOTE — PROGRESS NOTES
2nd attempt made to reach patient for TCC call. Left voicemail please call [phone] leave first name, last, and date of birth for Adelita. I will return your call.

## 2025-06-19 DIAGNOSIS — K76.6 PORTOPULMONARY HYPERTENSION: Primary | ICD-10-CM

## 2025-06-19 DIAGNOSIS — I27.20 PORTOPULMONARY HYPERTENSION: Primary | ICD-10-CM

## 2025-06-25 ENCOUNTER — PROCEDURE VISIT (OUTPATIENT)
Dept: RESPIRATORY THERAPY | Facility: HOSPITAL | Age: 65
End: 2025-06-25
Attending: INTERNAL MEDICINE
Payer: MEDICARE

## 2025-06-25 VITALS — RESPIRATION RATE: 19 BRPM | HEART RATE: 60 BPM | OXYGEN SATURATION: 98 %

## 2025-06-25 DIAGNOSIS — K76.6 PORTOPULMONARY HYPERTENSION: ICD-10-CM

## 2025-06-25 DIAGNOSIS — I27.20 PORTOPULMONARY HYPERTENSION: ICD-10-CM

## 2025-06-25 PROCEDURE — 94727 GAS DIL/WSHOT DETER LNG VOL: CPT

## 2025-06-25 PROCEDURE — 94729 DIFFUSING CAPACITY: CPT

## 2025-06-25 PROCEDURE — 94060 EVALUATION OF WHEEZING: CPT

## 2025-06-25 PROCEDURE — 94760 N-INVAS EAR/PLS OXIMETRY 1: CPT

## 2025-06-25 RX ORDER — ALBUTEROL SULFATE 0.83 MG/ML
SOLUTION RESPIRATORY (INHALATION)
Status: DISPENSED
Start: 2025-06-25 | End: 2025-06-26

## 2025-06-25 RX ORDER — ALBUTEROL SULFATE 0.83 MG/ML
2.5 SOLUTION RESPIRATORY (INHALATION)
Status: COMPLETED | OUTPATIENT
Start: 2025-06-25 | End: 2025-06-25

## 2025-06-25 RX ADMIN — ALBUTEROL SULFATE 2.5 MG: 0.83 SOLUTION RESPIRATORY (INHALATION) at 07:06

## 2025-06-26 LAB
DLCO SINGLE BREATH LLN: 15.04
DLCO SINGLE BREATH PRE REF: 60.1 %
DLCO SINGLE BREATH REF: 20.77
DLCOC SBVA LLN: 2.93
DLCOC SBVA REF: 4.51
DLCOC SINGLE BREATH LLN: 15.04
DLCOC SINGLE BREATH REF: 20.77
DLCOCSBVAULN: 6.09
DLCOCSINGLEBREATHULN: 26.51
DLCOSINGLEBREATHULN: 26.51
DLCOVA LLN: 2.93
DLCOVA PRE REF: 97.2 %
DLCOVA PRE: 4.39 ML/(MIN*MMHG*L) (ref 2.93–6.09)
DLCOVA REF: 4.51
DLCOVAULN: 6.09
ERV LLN: -16449.3
ERV PRE REF: 44.4 %
ERV REF: 0.7
ERVULN: ABNORMAL
FEF 25 75 LLN: 0.95
FEF 25 75 PRE REF: 59.7 %
FEF 25 75 REF: 1.95
FEF2575CHANGE: 5.8 %
FET100CHANGE: 0.2 %
FEV1 FVC LLN: 66
FEV1 FVC PRE REF: 98.3 %
FEV1 FVC REF: 79
FEV1 LLN: 1.62
FEV1 PRE REF: 61.6 %
FEV1 REF: 2.19
FEV1CHANGE: 7.8 %
FEV1FVCCHANGE: -0.3 %
FRCPLETH LLN: 1.77
FRCPLETH PREREF: 55.3 %
FRCPLETH REF: 2.59
FRCPLETHULN: 3.42
FVC LLN: 2.07
FVC PRE REF: 62.3 %
FVC REF: 2.79
FVCCHANGE: 8.1 %
IVC PRE: 1.96 L (ref 2.07–3.54)
IVC SINGLE BREATH LLN: 2.07
IVC SINGLE BREATH PRE REF: 70.4 %
IVC SINGLE BREATH REF: 2.79
IVCSINGLEBREATHULN: 3.54
LLN IC: -16448.2
MVV LLN: 68
MVV PRE REF: 68.9 %
MVV REF: 80
PEF LLN: 4.09
PEF PRE REF: 88.4 %
PEF REF: 5.69
PEFCHANGE: 9.9 %
POST FEF 25 75: 1.23 L/S (ref 0.95–3.35)
POST FET 100: 6.5 SEC
POST FEV1 FVC: 77.38 % (ref 66.24–89.86)
POST FEV1: 1.45 L (ref 1.62–2.73)
POST FVC: 1.88 L (ref 2.07–3.54)
POST PEF: 5.53 L/S (ref 4.09–7.3)
PRE DLCO: 12.49 ML/(MIN*MMHG) (ref 15.04–26.51)
PRE ERV: 0.31 L (ref -16449.3–16450.7)
PRE FEF 25 75: 1.17 L/S (ref 0.95–3.35)
PRE FET 100: 6.49 SEC
PRE FEV1 FVC: 77.59 % (ref 66.24–89.86)
PRE FEV1: 1.35 L (ref 1.62–2.73)
PRE FRC PL: 1.43 L (ref 1.77–3.42)
PRE FVC: 1.74 L (ref 2.07–3.54)
PRE IC: 1.42 L (ref -16448.2–16451.8)
PRE MVV: 55.03 L/MIN (ref 67.85–91.79)
PRE PEF: 5.03 L/S (ref 4.09–7.3)
PRE REF IC: 79.3 %
PRE RV: 1.12 L (ref 1.31–2.47)
PRE TLC: 2.86 L (ref 3.62–5.59)
RAW PRE REF: 245.9 %
RAW PRE: 7.52 CMH2O*S/L (ref 3.06–3.06)
RAW REF: 3.06
REF IC: 1.8
RV LLN: 1.31
RV PRE REF: 59.4 %
RV REF: 1.89
RVTLC LLN: 31
RVTLC PRE REF: 95.6 %
RVTLC PRE: 39.27 % (ref 31.47–50.65)
RVTLC REF: 41
RVTLCULN: 51
RVULN: 2.47
SGAW PRE REF: 77.4 %
SGAW PRE: 0.08 1/(CMH2O*S) (ref 0.1–0.1)
SGAW REF: 0.1
TLC LLN: 3.62
TLC PRE REF: 62.1 %
TLC REF: 4.6
TLC ULN: 5.59
ULN IC: ABNORMAL
VA PRE: 2.85 L (ref 4.45–4.45)
VA SINGLE BREATH PRE REF: 64 %
VA SINGLE BREATH REF: 4.45
VC LLN: 2.07
VC PRE REF: 62.3 %
VC PRE: 1.74 L (ref 2.07–3.54)
VC REF: 2.79
VC ULN: 3.54

## (undated) DEVICE — PAD HEARTSTART DEFIB ADULT

## (undated) DEVICE — GUIDEWIRE RUNTHROUGH EF 180CM

## (undated) DEVICE — CATH EAGLE EYE ST .014X20X150

## (undated) DEVICE — SUT VICRYL CTD 2-0 GI 27 SH

## (undated) DEVICE — PACK PACEMAKER

## (undated) DEVICE — GUIDEWIRE PROWATER .014X180CM

## (undated) DEVICE — DRAPE INCISE IOBAN 2 23X17IN

## (undated) DEVICE — Device

## (undated) DEVICE — CATH SHOCKWAVE C2 IVL 3.5X12MM

## (undated) DEVICE — GUIDEWIRE EMERALD 3MM 175X5CM

## (undated) DEVICE — SET EXT NAMIC ARTERIAL 12IN

## (undated) DEVICE — CATH IMPULSE MP 5FR 145CM

## (undated) DEVICE — KIT MINI STK MAX COAX 5FR 10CM

## (undated) DEVICE — VALVE CONTROL COPILOT

## (undated) DEVICE — SHEATH INTRODUCER 6FR 11CM

## (undated) DEVICE — SUT 4-0 VICRYL / FS-2

## (undated) DEVICE — CATH NC QUANTUM APEX MR 4X12

## (undated) DEVICE — CATH ANGIOSCULPT EVO 3.5X10MM

## (undated) DEVICE — SUT SILK 0 SH 30IN BLK BR

## (undated) DEVICE — ILLUMINATOR PHOTONBLADE 8/11IN

## (undated) DEVICE — SUT VICRYL 3-0 27 CT-1

## (undated) DEVICE — GUIDE LAUNCHER 6FR JR 4.0 SH

## (undated) DEVICE — DEVICE INDEFLATOR BASIX

## (undated) DEVICE — SET ANGIO ACIST CVI ANGIOTOUCH

## (undated) DEVICE — CANNULA NASAL ADULT

## (undated) DEVICE — SHEATH INTRODUCER 5FR 10CM

## (undated) DEVICE — CATH BLLN FG APEX MR 3.50X15MM